# Patient Record
Sex: FEMALE | Race: ASIAN | NOT HISPANIC OR LATINO | Employment: FULL TIME | URBAN - METROPOLITAN AREA
[De-identification: names, ages, dates, MRNs, and addresses within clinical notes are randomized per-mention and may not be internally consistent; named-entity substitution may affect disease eponyms.]

---

## 2020-01-22 ENCOUNTER — OFFICE VISIT (OUTPATIENT)
Dept: PRIMARY CARE CLINIC | Facility: CLINIC | Age: 30
End: 2020-01-22

## 2020-01-22 VITALS
DIASTOLIC BLOOD PRESSURE: 58 MMHG | TEMPERATURE: 100 F | WEIGHT: 115.44 LBS | SYSTOLIC BLOOD PRESSURE: 98 MMHG | HEIGHT: 62 IN | BODY MASS INDEX: 21.24 KG/M2 | RESPIRATION RATE: 18 BRPM | OXYGEN SATURATION: 98 % | HEART RATE: 98 BPM

## 2020-01-22 DIAGNOSIS — H66.92 LEFT OTITIS MEDIA, UNSPECIFIED OTITIS MEDIA TYPE: ICD-10-CM

## 2020-01-22 DIAGNOSIS — J10.1 INFLUENZA A: ICD-10-CM

## 2020-01-22 DIAGNOSIS — R68.89 FLU-LIKE SYMPTOMS: Primary | ICD-10-CM

## 2020-01-22 DIAGNOSIS — J02.9 PHARYNGITIS, UNSPECIFIED ETIOLOGY: ICD-10-CM

## 2020-01-22 LAB
CTP QC/QA: YES
POC MOLECULAR INFLUENZA A AGN: POSITIVE
POC MOLECULAR INFLUENZA B AGN: NEGATIVE

## 2020-01-22 PROCEDURE — 99999 PR PBB SHADOW E&M-NEW PATIENT-LVL III: CPT | Mod: PBBFAC,,, | Performed by: INTERNAL MEDICINE

## 2020-01-22 PROCEDURE — 99213 OFFICE O/P EST LOW 20 MIN: CPT | Mod: S$PBB,,, | Performed by: INTERNAL MEDICINE

## 2020-01-22 PROCEDURE — 99203 OFFICE O/P NEW LOW 30 MIN: CPT | Mod: PBBFAC,PN,25 | Performed by: INTERNAL MEDICINE

## 2020-01-22 PROCEDURE — 99999 PR PBB SHADOW E&M-NEW PATIENT-LVL III: ICD-10-PCS | Mod: PBBFAC,,, | Performed by: INTERNAL MEDICINE

## 2020-01-22 PROCEDURE — 99213 PR OFFICE/OUTPT VISIT, EST, LEVL III, 20-29 MIN: ICD-10-PCS | Mod: S$PBB,,, | Performed by: INTERNAL MEDICINE

## 2020-01-22 PROCEDURE — 87502 INFLUENZA DNA AMP PROBE: CPT | Mod: PBBFAC,PN | Performed by: INTERNAL MEDICINE

## 2020-01-22 PROCEDURE — 96372 THER/PROPH/DIAG INJ SC/IM: CPT | Mod: PBBFAC,PN

## 2020-01-22 RX ORDER — AMOXICILLIN AND CLAVULANATE POTASSIUM 875; 125 MG/1; MG/1
1 TABLET, FILM COATED ORAL EVERY 12 HOURS
Qty: 20 TABLET | Refills: 0 | Status: SHIPPED | OUTPATIENT
Start: 2020-01-22 | End: 2021-10-21

## 2020-01-22 RX ORDER — OSELTAMIVIR PHOSPHATE 75 MG/1
75 CAPSULE ORAL 2 TIMES DAILY
Qty: 10 CAPSULE | Refills: 0 | Status: SHIPPED | OUTPATIENT
Start: 2020-01-22 | End: 2020-01-27

## 2020-01-22 RX ORDER — CODEINE PHOSPHATE AND GUAIFENESIN 10; 100 MG/5ML; MG/5ML
5 SOLUTION ORAL EVERY 6 HOURS PRN
Qty: 120 ML | Refills: 0 | Status: SHIPPED | OUTPATIENT
Start: 2020-01-22 | End: 2021-10-21

## 2020-01-22 RX ORDER — TRIAMCINOLONE ACETONIDE 40 MG/ML
60 INJECTION, SUSPENSION INTRA-ARTICULAR; INTRAMUSCULAR ONCE
Status: COMPLETED | OUTPATIENT
Start: 2020-01-22 | End: 2020-01-22

## 2020-01-22 RX ORDER — NAPROXEN SODIUM 220 MG
220 TABLET ORAL
COMMUNITY
End: 2021-10-21

## 2020-01-22 RX ADMIN — CEFTRIAXONE SODIUM 1 G: 1 INJECTION, POWDER, FOR SOLUTION INTRAMUSCULAR; INTRAVENOUS at 04:01

## 2020-01-22 RX ADMIN — TRIAMCINOLONE ACETONIDE 60 MG: 40 INJECTION, SUSPENSION INTRA-ARTICULAR; INTRAMUSCULAR at 04:01

## 2020-01-22 NOTE — PROGRESS NOTES
Verified pt ID using name and . NKDA.  Administered Rocephin 1gm IM given right VG and Kenalog 60mg given left VG IM per physician order using aseptic technique. Aspirated and no blood return noted. Pt tolerated well with no adverse reactions noted.

## 2020-01-22 NOTE — PROGRESS NOTES
Subjective:       Patient ID: Sean Trujillo is a 30 y.o. female.    Chief Complaint: Cough (cough and bodyaches x 5 days) and Otalgia (left ear)    HPI  patient complained begin the get sick about 5 days ago with coughing congestion severe body ache fever now coughing getting worse also her left ear begin to hurt seen yesterday nausea no vomiting patient did not have flu vaccine this year currently not pregnant patient request injections deny any past medical history of past surgical history patient not a smoker no EtOH family history unremarkable review of system negative except as above  Review of Systems    Objective:      Physical Exam   Constitutional: She is oriented to person, place, and time. She appears well-developed and well-nourished. No distress.   HENT:   Head: Normocephalic and atraumatic.   Right Ear: External ear normal.   Mouth/Throat: Oropharynx is clear and moist. No oropharyngeal exudate.   Left external ear canal normal tympanic membrane severe patchy erythema  Nasal congestion bilaterally   Eyes: Pupils are equal, round, and reactive to light. Conjunctivae and EOM are normal. Right eye exhibits no discharge. Left eye exhibits no discharge.   Neck: Normal range of motion. Neck supple. No thyromegaly present.   Cardiovascular: Normal rate, regular rhythm, normal heart sounds and intact distal pulses. Exam reveals no gallop and no friction rub.   No murmur heard.  Pulmonary/Chest: Effort normal and breath sounds normal. No respiratory distress. She has no wheezes. She has no rales. She exhibits no tenderness.   Constant cough   Abdominal: Soft. Bowel sounds are normal. She exhibits no distension. There is no tenderness. There is no rebound and no guarding.   Musculoskeletal: Normal range of motion. She exhibits no edema, tenderness or deformity.   Lymphadenopathy:     She has no cervical adenopathy.   Neurological: She is alert and oriented to person, place, and time.   Skin: Skin is warm and dry.  Capillary refill takes less than 2 seconds. No rash noted. No erythema.   Psychiatric: She has a normal mood and affect. Judgment and thought content normal.   Nursing note and vitals reviewed.      Assessment:       1. Flu-like symptoms    2. Influenza A    3. Left otitis media, unspecified otitis media type    4. Pharyngitis, unspecified etiology        Plan:       Flu-like symptoms  -     POCT Influenza A/B Molecular  -     guaifenesin-codeine 100-10 mg/5 ml (TUSSI-ORGANIDIN NR)  mg/5 mL syrup; Take 5 mLs by mouth every 6 (six) hours as needed.  Dispense: 120 mL; Refill: 0    Influenza A  -     oseltamivir (TAMIFLU) 75 MG capsule; Take 1 capsule (75 mg total) by mouth 2 (two) times daily. for 5 days  Dispense: 10 capsule; Refill: 0    Left otitis media, unspecified otitis media type  -     triamcinolone acetonide injection 60 mg  -     cefTRIAXone (ROCEPHIN) 1 g in lidocaine HCL 10 mg/ml (1%) IM only syringe  -     amoxicillin-clavulanate 875-125mg (AUGMENTIN) 875-125 mg per tablet; Take 1 tablet by mouth every 12 (twelve) hours.  Dispense: 20 tablet; Refill: 0    Pharyngitis, unspecified etiology  -     triamcinolone acetonide injection 60 mg  -     cefTRIAXone (ROCEPHIN) 1 g in lidocaine HCL 10 mg/ml (1%) IM only syringe  -     amoxicillin-clavulanate 875-125mg (AUGMENTIN) 875-125 mg per tablet; Take 1 tablet by mouth every 12 (twelve) hours.  Dispense: 20 tablet; Refill: 0

## 2021-10-12 ENCOUNTER — TELEPHONE (OUTPATIENT)
Dept: PRIMARY CARE CLINIC | Facility: CLINIC | Age: 31
End: 2021-10-12

## 2021-10-21 ENCOUNTER — OFFICE VISIT (OUTPATIENT)
Dept: PRIMARY CARE CLINIC | Facility: CLINIC | Age: 31
End: 2021-10-21
Payer: MEDICAID

## 2021-10-21 VITALS
DIASTOLIC BLOOD PRESSURE: 60 MMHG | WEIGHT: 112.44 LBS | HEART RATE: 79 BPM | OXYGEN SATURATION: 99 % | SYSTOLIC BLOOD PRESSURE: 98 MMHG | RESPIRATION RATE: 18 BRPM | BODY MASS INDEX: 20.69 KG/M2 | HEIGHT: 62 IN

## 2021-10-21 DIAGNOSIS — Z01.419 ROUTINE GYNECOLOGICAL EXAMINATION: ICD-10-CM

## 2021-10-21 DIAGNOSIS — Z11.59 NEED FOR HEPATITIS C SCREENING TEST: ICD-10-CM

## 2021-10-21 DIAGNOSIS — Z13.220 ENCOUNTER FOR LIPID SCREENING FOR CARDIOVASCULAR DISEASE: ICD-10-CM

## 2021-10-21 DIAGNOSIS — Z00.00 ANNUAL PHYSICAL EXAM: Primary | ICD-10-CM

## 2021-10-21 DIAGNOSIS — Z13.6 ENCOUNTER FOR LIPID SCREENING FOR CARDIOVASCULAR DISEASE: ICD-10-CM

## 2021-10-21 PROCEDURE — 87491 CHLMYD TRACH DNA AMP PROBE: CPT | Performed by: INTERNAL MEDICINE

## 2021-10-21 PROCEDURE — 99395 PR PREVENTIVE VISIT,EST,18-39: ICD-10-PCS | Mod: S$PBB,,, | Performed by: INTERNAL MEDICINE

## 2021-10-21 PROCEDURE — 87591 N.GONORRHOEAE DNA AMP PROB: CPT | Performed by: INTERNAL MEDICINE

## 2021-10-21 PROCEDURE — 99999 PR PBB SHADOW E&M-EST. PATIENT-LVL IV: ICD-10-PCS | Mod: PBBFAC,,, | Performed by: INTERNAL MEDICINE

## 2021-10-21 PROCEDURE — 88175 CYTOPATH C/V AUTO FLUID REDO: CPT | Performed by: PATHOLOGY

## 2021-10-21 PROCEDURE — 88141 PR  CYTOPATH CERV/VAG INTERPRET: ICD-10-PCS | Mod: ,,, | Performed by: PATHOLOGY

## 2021-10-21 PROCEDURE — 99999 PR PBB SHADOW E&M-EST. PATIENT-LVL IV: CPT | Mod: PBBFAC,,, | Performed by: INTERNAL MEDICINE

## 2021-10-21 PROCEDURE — 99395 PREV VISIT EST AGE 18-39: CPT | Mod: S$PBB,,, | Performed by: INTERNAL MEDICINE

## 2021-10-21 PROCEDURE — 99214 OFFICE O/P EST MOD 30 MIN: CPT | Mod: PBBFAC,PN | Performed by: INTERNAL MEDICINE

## 2021-10-21 PROCEDURE — 88141 CYTOPATH C/V INTERPRET: CPT | Mod: ,,, | Performed by: PATHOLOGY

## 2021-10-21 RX ORDER — PREDNISOLONE ACETATE 10 MG/ML
SUSPENSION/ DROPS OPHTHALMIC
COMMUNITY
Start: 2021-10-07 | End: 2024-01-30

## 2021-10-22 LAB
C TRACH DNA SPEC QL NAA+PROBE: NOT DETECTED
N GONORRHOEA DNA SPEC QL NAA+PROBE: NOT DETECTED

## 2021-11-02 LAB
FINAL PATHOLOGIC DIAGNOSIS: ABNORMAL
Lab: ABNORMAL

## 2021-11-03 ENCOUNTER — TELEPHONE (OUTPATIENT)
Dept: PRIMARY CARE CLINIC | Facility: CLINIC | Age: 31
End: 2021-11-03
Payer: MEDICAID

## 2021-11-05 ENCOUNTER — PATIENT MESSAGE (OUTPATIENT)
Dept: PRIMARY CARE CLINIC | Facility: CLINIC | Age: 31
End: 2021-11-05
Payer: MEDICAID

## 2021-12-23 ENCOUNTER — OFFICE VISIT (OUTPATIENT)
Dept: OBSTETRICS AND GYNECOLOGY | Facility: CLINIC | Age: 31
End: 2021-12-23
Payer: MEDICAID

## 2021-12-23 VITALS
BODY MASS INDEX: 21.91 KG/M2 | WEIGHT: 119.06 LBS | HEIGHT: 62 IN | DIASTOLIC BLOOD PRESSURE: 60 MMHG | SYSTOLIC BLOOD PRESSURE: 102 MMHG

## 2021-12-23 DIAGNOSIS — N89.8 VAGINAL DISCHARGE: ICD-10-CM

## 2021-12-23 DIAGNOSIS — Z12.4 CERVICAL CANCER SCREENING: Primary | ICD-10-CM

## 2021-12-23 PROCEDURE — 88175 CYTOPATH C/V AUTO FLUID REDO: CPT | Performed by: STUDENT IN AN ORGANIZED HEALTH CARE EDUCATION/TRAINING PROGRAM

## 2021-12-23 PROCEDURE — 99213 OFFICE O/P EST LOW 20 MIN: CPT | Mod: PBBFAC,PN | Performed by: STUDENT IN AN ORGANIZED HEALTH CARE EDUCATION/TRAINING PROGRAM

## 2021-12-23 PROCEDURE — 99385 PR PREVENTIVE VISIT,NEW,18-39: ICD-10-PCS | Mod: S$PBB,,, | Performed by: STUDENT IN AN ORGANIZED HEALTH CARE EDUCATION/TRAINING PROGRAM

## 2021-12-23 PROCEDURE — 87481 CANDIDA DNA AMP PROBE: CPT | Mod: 59 | Performed by: STUDENT IN AN ORGANIZED HEALTH CARE EDUCATION/TRAINING PROGRAM

## 2021-12-23 PROCEDURE — 1160F RVW MEDS BY RX/DR IN RCRD: CPT | Mod: CPTII,,, | Performed by: STUDENT IN AN ORGANIZED HEALTH CARE EDUCATION/TRAINING PROGRAM

## 2021-12-23 PROCEDURE — 3078F PR MOST RECENT DIASTOLIC BLOOD PRESSURE < 80 MM HG: ICD-10-PCS | Mod: CPTII,,, | Performed by: STUDENT IN AN ORGANIZED HEALTH CARE EDUCATION/TRAINING PROGRAM

## 2021-12-23 PROCEDURE — 99385 PREV VISIT NEW AGE 18-39: CPT | Mod: S$PBB,,, | Performed by: STUDENT IN AN ORGANIZED HEALTH CARE EDUCATION/TRAINING PROGRAM

## 2021-12-23 PROCEDURE — 3078F DIAST BP <80 MM HG: CPT | Mod: CPTII,,, | Performed by: STUDENT IN AN ORGANIZED HEALTH CARE EDUCATION/TRAINING PROGRAM

## 2021-12-23 PROCEDURE — 99999 PR PBB SHADOW E&M-EST. PATIENT-LVL III: CPT | Mod: PBBFAC,,, | Performed by: STUDENT IN AN ORGANIZED HEALTH CARE EDUCATION/TRAINING PROGRAM

## 2021-12-23 PROCEDURE — 3074F SYST BP LT 130 MM HG: CPT | Mod: CPTII,,, | Performed by: STUDENT IN AN ORGANIZED HEALTH CARE EDUCATION/TRAINING PROGRAM

## 2021-12-23 PROCEDURE — 1159F PR MEDICATION LIST DOCUMENTED IN MEDICAL RECORD: ICD-10-PCS | Mod: CPTII,,, | Performed by: STUDENT IN AN ORGANIZED HEALTH CARE EDUCATION/TRAINING PROGRAM

## 2021-12-23 PROCEDURE — 99999 PR PBB SHADOW E&M-EST. PATIENT-LVL III: ICD-10-PCS | Mod: PBBFAC,,, | Performed by: STUDENT IN AN ORGANIZED HEALTH CARE EDUCATION/TRAINING PROGRAM

## 2021-12-23 PROCEDURE — 3008F PR BODY MASS INDEX (BMI) DOCUMENTED: ICD-10-PCS | Mod: CPTII,,, | Performed by: STUDENT IN AN ORGANIZED HEALTH CARE EDUCATION/TRAINING PROGRAM

## 2021-12-23 PROCEDURE — 1159F MED LIST DOCD IN RCRD: CPT | Mod: CPTII,,, | Performed by: STUDENT IN AN ORGANIZED HEALTH CARE EDUCATION/TRAINING PROGRAM

## 2021-12-23 PROCEDURE — 87491 CHLMYD TRACH DNA AMP PROBE: CPT | Performed by: STUDENT IN AN ORGANIZED HEALTH CARE EDUCATION/TRAINING PROGRAM

## 2021-12-23 PROCEDURE — 1160F PR REVIEW ALL MEDS BY PRESCRIBER/CLIN PHARMACIST DOCUMENTED: ICD-10-PCS | Mod: CPTII,,, | Performed by: STUDENT IN AN ORGANIZED HEALTH CARE EDUCATION/TRAINING PROGRAM

## 2021-12-23 PROCEDURE — 3074F PR MOST RECENT SYSTOLIC BLOOD PRESSURE < 130 MM HG: ICD-10-PCS | Mod: CPTII,,, | Performed by: STUDENT IN AN ORGANIZED HEALTH CARE EDUCATION/TRAINING PROGRAM

## 2021-12-23 PROCEDURE — 87624 HPV HI-RISK TYP POOLED RSLT: CPT | Performed by: STUDENT IN AN ORGANIZED HEALTH CARE EDUCATION/TRAINING PROGRAM

## 2021-12-23 PROCEDURE — 3008F BODY MASS INDEX DOCD: CPT | Mod: CPTII,,, | Performed by: STUDENT IN AN ORGANIZED HEALTH CARE EDUCATION/TRAINING PROGRAM

## 2021-12-23 PROCEDURE — 87591 N.GONORRHOEAE DNA AMP PROB: CPT | Performed by: STUDENT IN AN ORGANIZED HEALTH CARE EDUCATION/TRAINING PROGRAM

## 2021-12-30 LAB
C TRACH DNA SPEC QL NAA+PROBE: NOT DETECTED
N GONORRHOEA DNA SPEC QL NAA+PROBE: NOT DETECTED

## 2022-01-03 LAB
BACTERIAL VAGINOSIS DNA: NEGATIVE
CANDIDA GLABRATA DNA: NEGATIVE
CANDIDA KRUSEI DNA: NEGATIVE
CANDIDA RRNA VAG QL PROBE: NEGATIVE
FINAL PATHOLOGIC DIAGNOSIS: NORMAL
Lab: NORMAL
T VAGINALIS RRNA GENITAL QL PROBE: NEGATIVE

## 2022-01-04 LAB
HPV HR 12 DNA SPEC QL NAA+PROBE: NEGATIVE
HPV16 AG SPEC QL: NEGATIVE
HPV18 DNA SPEC QL NAA+PROBE: NEGATIVE

## 2022-01-05 ENCOUNTER — PATIENT MESSAGE (OUTPATIENT)
Dept: OBSTETRICS AND GYNECOLOGY | Facility: CLINIC | Age: 32
End: 2022-01-05
Payer: MEDICAID

## 2022-01-05 RX ORDER — METRONIDAZOLE 500 MG/1
500 TABLET ORAL EVERY 12 HOURS
Qty: 14 TABLET | Refills: 0 | Status: SHIPPED | OUTPATIENT
Start: 2022-01-05 | End: 2022-01-12

## 2022-01-14 ENCOUNTER — NURSE TRIAGE (OUTPATIENT)
Dept: ADMINISTRATIVE | Facility: CLINIC | Age: 32
End: 2022-01-14
Payer: MEDICAID

## 2022-01-14 DIAGNOSIS — R05.9 COUGH: ICD-10-CM

## 2022-01-14 DIAGNOSIS — J22 LOWER RESPIRATORY INFECTION: Primary | ICD-10-CM

## 2022-01-14 DIAGNOSIS — J40 BRONCHITIS: ICD-10-CM

## 2022-01-14 RX ORDER — DOXYCYCLINE 100 MG/1
100 CAPSULE ORAL 2 TIMES DAILY
Qty: 14 CAPSULE | Refills: 0 | Status: SHIPPED | OUTPATIENT
Start: 2022-01-14 | End: 2023-07-28

## 2022-01-14 RX ORDER — FAMOTIDINE 40 MG/1
40 TABLET, FILM COATED ORAL 2 TIMES DAILY
Qty: 20 TABLET | Refills: 0 | Status: SHIPPED | OUTPATIENT
Start: 2022-01-14 | End: 2023-07-28

## 2022-01-14 RX ORDER — PROMETHAZINE HYDROCHLORIDE AND CODEINE PHOSPHATE 6.25; 1 MG/5ML; MG/5ML
5 SOLUTION ORAL EVERY 4 HOURS PRN
Qty: 30 EACH | Refills: 0 | Status: SHIPPED | OUTPATIENT
Start: 2022-01-14 | End: 2022-01-24

## 2022-01-14 RX ORDER — ERGOCALCIFEROL 1.25 MG/1
50000 CAPSULE ORAL
Qty: 12 CAPSULE | Refills: 0 | Status: SHIPPED | OUTPATIENT
Start: 2022-01-14 | End: 2022-04-02

## 2022-01-14 RX ORDER — QUERCETIN DIHYDRATE 100 %
300 POWDER (GRAM) MISCELLANEOUS
Qty: 40 G | Refills: 1 | Status: SHIPPED | OUTPATIENT
Start: 2022-01-14 | End: 2022-01-24

## 2022-01-14 RX ORDER — ALBUTEROL SULFATE 90 UG/1
2 AEROSOL, METERED RESPIRATORY (INHALATION) EVERY 6 HOURS PRN
Qty: 18 G | Refills: 0 | Status: SHIPPED | OUTPATIENT
Start: 2022-01-14 | End: 2023-07-28

## 2022-01-14 RX ORDER — BENZONATATE 200 MG/1
200 CAPSULE ORAL 3 TIMES DAILY PRN
Qty: 60 CAPSULE | Refills: 1 | Status: SHIPPED | OUTPATIENT
Start: 2022-01-14 | End: 2022-02-13

## 2022-01-15 NOTE — TELEPHONE ENCOUNTER
William added to the call ID# [id number].  Patient states   She is having flu like symptoms.  Current symptoms are cough (over a month) weeks, fatigue, chills, headache, sore throat, and body aches.   Patient's cough is continuous.  States the other symptoms started after cough.   She has taken Delsym, Theraflu, and ginger tea.  Advised patient to be seen within 3-4 hours she declined.  She states she wants something called in. Spoke with on call provider Dr. Fulton whom states that patient should try robitussin DM and go to Urgent Care tomorrow.  States he will send medication after reviewing chart of he feels it is necessary.  He wants  patient to go to ER [facility] if she experiences the following symptoms low B/P, tachycardia, fever, oxygen saturation of 92% or lower, or coughing fits thtoat are continuous.   Patient given information and she verbalized understanding.   Reason for Disposition   [1] Continuous (nonstop) coughing interferes with work or school AND [2] no improvement using cough treatment per protocol    Additional Information   Negative: SEVERE difficulty breathing (e.g., struggling for each breath, speaks in single words)   Negative: Difficult to awaken or acting confused (e.g., disoriented, slurred speech)   Negative: Bluish (or gray) lips or face now   Negative: Shock suspected (e.g., cold/pale/clammy skin, too weak to stand, low BP, rapid pulse)   Negative: Sounds like a life-threatening emergency to the triager   Negative: SEVERE or constant chest pain or pressure (Exception: mild central chest pain, present only when coughing)   Negative: MODERATE difficulty breathing (e.g., speaks in phrases, SOB even at rest, pulse 100-120)   Negative: [1] Headache AND [2] stiff neck (can't touch chin to chest)   Negative: MILD difficulty breathing (e.g., minimal/no SOB at rest, SOB with walking, pulse <100)   Negative: Chest pain or pressure   Negative: Patient sounds very sick or weak to  the triager   Negative: Fever > 103 F (39.4 C)   Negative: [1] Fever > 101 F (38.3 C) AND [2] age > 60 years   Negative: [1] Fever > 100.0 F (37.8 C) AND [2] bedridden (e.g., nursing home patient, CVA, chronic illness, recovering from surgery)   Negative: HIGH RISK for severe COVID complications (e.g., age > 64 years, obesity with BMI > 25, pregnant, chronic lung disease or other chronic medical condition)  (Exception: Already seen by PCP and no new or worsening symptoms.)   Negative: [1] HIGH RISK patient AND [2] influenza is widespread in the community AND [3] ONE OR MORE respiratory symptoms: cough, sore throat, runny or stuffy nose   Negative: [1] HIGH RISK patient AND [2] influenza exposure within the last 7 days AND [3] ONE OR MORE respiratory symptoms: cough, sore throat, runny or stuffy nose   Negative: [1] COVID-19 infection suspected by caller or triager AND [2] mild symptoms (cough, fever, or others) AND [3] negative COVID-19 rapid test   Negative: Fever present > 3 days (72 hours)   Negative: [1] Fever returns after gone for over 24 hours AND [2] symptoms worse or not improved    Protocols used: CORONAVIRUS (COVID-19) DIAGNOSED OR [id number]

## 2022-01-18 ENCOUNTER — PATIENT MESSAGE (OUTPATIENT)
Dept: PRIMARY CARE CLINIC | Facility: CLINIC | Age: 32
End: 2022-01-18
Payer: MEDICAID

## 2022-02-03 ENCOUNTER — OFFICE VISIT (OUTPATIENT)
Dept: PRIMARY CARE CLINIC | Facility: CLINIC | Age: 32
End: 2022-02-03
Payer: MEDICAID

## 2022-02-03 DIAGNOSIS — J30.2 SEASONAL ALLERGIC RHINITIS, UNSPECIFIED TRIGGER: Primary | ICD-10-CM

## 2022-02-03 DIAGNOSIS — R05.9 COUGHING: ICD-10-CM

## 2022-02-03 PROCEDURE — 99213 OFFICE O/P EST LOW 20 MIN: CPT | Mod: 95,,, | Performed by: INTERNAL MEDICINE

## 2022-02-03 PROCEDURE — 99213 PR OFFICE/OUTPT VISIT, EST, LEVL III, 20-29 MIN: ICD-10-PCS | Mod: 95,,, | Performed by: INTERNAL MEDICINE

## 2022-02-03 RX ORDER — LEVOCETIRIZINE DIHYDROCHLORIDE 5 MG/1
5 TABLET, FILM COATED ORAL NIGHTLY
Qty: 30 TABLET | Refills: 5 | Status: SHIPPED | OUTPATIENT
Start: 2022-02-03 | End: 2023-07-20 | Stop reason: SDUPTHER

## 2022-02-03 NOTE — PROGRESS NOTES
Subjective:    The patient location is: car  The chief complaint leading to consultation is: coughing congestion x 2 months    Visit type: audiovisual    Face to Face time with patient: 12   minutes of total time spent on the encounter, which includes face to face time and non-face to face time preparing to see the patient (eg, review of tests), Obtaining and/or reviewing separately obtained history, Documenting clinical information in the electronic or other health record, Independently interpreting results (not separately reported) and communicating results to the patient/family/caregiver, or Care coordination (not separately reported).         Each patient to whom he or she provides medical services by telemedicine is:  (1) informed of the relationship between the physician and patient and the respective role of any other health care provider with respect to management of the patient; and (2) notified that he or she may decline to receive medical services by telemedicine and may withdraw from such care at any time.    Notes:    Patient ID: Sean Trujillo is a 32 y.o. female.    Chief Complaint: No chief complaint on file.    HPI  Pt visit today c/o chronic coughing congestion  More than 2 months she was given meds but did not take them except take MDI sometime when has sob she had chill but no fever she is doing james now except the coughing scratchy throat no fever chill night sweat cough nonproductive and not smoking not pegnant and this happened every year with weather change   Review of Systems    Objective:      Physical Exam  Constitutional:       General: She is not in acute distress.     Appearance: Normal appearance.   HENT:      Head: Atraumatic.   Eyes:      Extraocular Movements: Extraocular movements intact.   Pulmonary:      Effort: Pulmonary effort is normal.   Neurological:      General: No focal deficit present.      Mental Status: She is alert and oriented to person, place, and time.   Psychiatric:          Mood and Affect: Mood normal.         Thought Content: Thought content normal.         Judgment: Judgment normal.         Assessment:       1. Seasonal allergic rhinitis, unspecified trigger    2. Coughing        Plan:       Seasonal allergic rhinitis, unspecified trigger  -     levocetirizine (XYZAL) 5 MG tablet; Take 1 tablet (5 mg total) by mouth every evening.  Dispense: 30 tablet; Refill: 5    Coughing  Comments:  continue with tessalon reji prn        Medication List with Changes/Refills   New Medications    LEVOCETIRIZINE (XYZAL) 5 MG TABLET    Take 1 tablet (5 mg total) by mouth every evening.   Current Medications    ALBUTEROL (PROVENTIL HFA) 90 MCG/ACTUATION INHALER    Inhale 2 puffs into the lungs every 6 (six) hours as needed for Wheezing. Rescue    BENZONATATE (TESSALON) 200 MG CAPSULE    Take 1 capsule (200 mg total) by mouth 3 (three) times daily as needed for Cough.    DOXYCYCLINE (VIBRAMYCIN) 100 MG CAP    Take 1 capsule (100 mg total) by mouth 2 (two) times daily.    ERGOCALCIFEROL (ERGOCALCIFEROL) 50,000 UNIT CAP    Take 1 capsule (50,000 Units total) by mouth every 7 days. for 12 doses    FAMOTIDINE (PEPCID) 40 MG TABLET    Take 1 tablet (40 mg total) by mouth 2 (two) times daily. for 10 days    PREDNISOLONE ACETATE (PRED FORTE) 1 % DRPS

## 2022-07-09 ENCOUNTER — OFFICE VISIT (OUTPATIENT)
Dept: URGENT CARE | Facility: CLINIC | Age: 32
End: 2022-07-09
Payer: MEDICAID

## 2022-07-09 VITALS
RESPIRATION RATE: 17 BRPM | SYSTOLIC BLOOD PRESSURE: 94 MMHG | DIASTOLIC BLOOD PRESSURE: 65 MMHG | OXYGEN SATURATION: 99 % | TEMPERATURE: 100 F | HEART RATE: 91 BPM | BODY MASS INDEX: 21.9 KG/M2 | HEIGHT: 62 IN | WEIGHT: 119 LBS

## 2022-07-09 DIAGNOSIS — J02.0 STREP THROAT: Primary | ICD-10-CM

## 2022-07-09 LAB
CTP QC/QA: YES
CTP QC/QA: YES
MOLECULAR STREP A: POSITIVE
SARS-COV-2 RDRP RESP QL NAA+PROBE: NEGATIVE

## 2022-07-09 PROCEDURE — 3078F PR MOST RECENT DIASTOLIC BLOOD PRESSURE < 80 MM HG: ICD-10-PCS | Mod: CPTII,S$GLB,, | Performed by: NURSE PRACTITIONER

## 2022-07-09 PROCEDURE — 87651 POCT STREP A MOLECULAR: ICD-10-PCS | Mod: QW,S$GLB,, | Performed by: NURSE PRACTITIONER

## 2022-07-09 PROCEDURE — 1160F PR REVIEW ALL MEDS BY PRESCRIBER/CLIN PHARMACIST DOCUMENTED: ICD-10-PCS | Mod: CPTII,S$GLB,, | Performed by: NURSE PRACTITIONER

## 2022-07-09 PROCEDURE — 3074F SYST BP LT 130 MM HG: CPT | Mod: CPTII,S$GLB,, | Performed by: NURSE PRACTITIONER

## 2022-07-09 PROCEDURE — U0002 COVID-19 LAB TEST NON-CDC: HCPCS | Mod: QW,S$GLB,, | Performed by: NURSE PRACTITIONER

## 2022-07-09 PROCEDURE — 3074F PR MOST RECENT SYSTOLIC BLOOD PRESSURE < 130 MM HG: ICD-10-PCS | Mod: CPTII,S$GLB,, | Performed by: NURSE PRACTITIONER

## 2022-07-09 PROCEDURE — 1159F PR MEDICATION LIST DOCUMENTED IN MEDICAL RECORD: ICD-10-PCS | Mod: CPTII,S$GLB,, | Performed by: NURSE PRACTITIONER

## 2022-07-09 PROCEDURE — U0002: ICD-10-PCS | Mod: QW,S$GLB,, | Performed by: NURSE PRACTITIONER

## 2022-07-09 PROCEDURE — 3078F DIAST BP <80 MM HG: CPT | Mod: CPTII,S$GLB,, | Performed by: NURSE PRACTITIONER

## 2022-07-09 PROCEDURE — 3008F BODY MASS INDEX DOCD: CPT | Mod: CPTII,S$GLB,, | Performed by: NURSE PRACTITIONER

## 2022-07-09 PROCEDURE — 1160F RVW MEDS BY RX/DR IN RCRD: CPT | Mod: CPTII,S$GLB,, | Performed by: NURSE PRACTITIONER

## 2022-07-09 PROCEDURE — 3008F PR BODY MASS INDEX (BMI) DOCUMENTED: ICD-10-PCS | Mod: CPTII,S$GLB,, | Performed by: NURSE PRACTITIONER

## 2022-07-09 PROCEDURE — 1159F MED LIST DOCD IN RCRD: CPT | Mod: CPTII,S$GLB,, | Performed by: NURSE PRACTITIONER

## 2022-07-09 PROCEDURE — 99213 PR OFFICE/OUTPT VISIT, EST, LEVL III, 20-29 MIN: ICD-10-PCS | Mod: S$GLB,,, | Performed by: NURSE PRACTITIONER

## 2022-07-09 PROCEDURE — 87651 STREP A DNA AMP PROBE: CPT | Mod: QW,S$GLB,, | Performed by: NURSE PRACTITIONER

## 2022-07-09 PROCEDURE — 99213 OFFICE O/P EST LOW 20 MIN: CPT | Mod: S$GLB,,, | Performed by: NURSE PRACTITIONER

## 2022-07-09 RX ORDER — IBUPROFEN 800 MG/1
800 TABLET ORAL EVERY 6 HOURS PRN
COMMUNITY
Start: 2022-02-18 | End: 2024-01-30

## 2022-07-09 RX ORDER — PREDNISONE 20 MG/1
20 TABLET ORAL DAILY
Qty: 3 TABLET | Refills: 0 | Status: SHIPPED | OUTPATIENT
Start: 2022-07-09 | End: 2022-07-12

## 2022-07-09 RX ORDER — AMOXICILLIN 500 MG/1
500 CAPSULE ORAL EVERY 12 HOURS
Qty: 20 CAPSULE | Refills: 0 | Status: SHIPPED | OUTPATIENT
Start: 2022-07-09 | End: 2022-07-19

## 2022-07-09 NOTE — PROGRESS NOTES
"Subjective:       Patient ID: Sean Trujillo is a 32 y.o. female.    Vitals:  height is 5' 2" (1.575 m) and weight is 54 kg (119 lb). Her oral temperature is 99.5 °F (37.5 °C). Her blood pressure is 94/65 and her pulse is 91. Her respiration is 17 and oxygen saturation is 99%.     Chief Complaint: URI    32-year-old female presents to clinic for evaluation of bilateral ear pain, headaches, sore throat, nausea x3 days.  Patient is vaccinated for COVID, denies any recent sick contacts.  She has been taking ibuprofen with some relief.  She states that her main complaint today is her sore throat.  She denies chest pain or shortness of breath.  She is awake and alert, answers questions appropriately, no acute distress noted on today's visit.    URI   This is a new problem. Episode onset: 2 days ago. The problem has been gradually worsening. There has been no fever. Associated symptoms include ear pain, headaches, nausea and a sore throat. Pertinent negatives include no abdominal pain, chest pain or coughing. Associated symptoms comments: Trouble swallowing, body aches, congestion. Treatments tried: ibuprofen. The treatment provided mild relief.       Constitution: Negative for activity change, appetite change, chills, sweating, fatigue and fever.   HENT: Positive for ear pain and sore throat.    Cardiovascular: Negative for chest pain, palpitations and sob on exertion.   Respiratory: Negative for cough and shortness of breath.    Gastrointestinal: Positive for nausea. Negative for abdominal pain.   Neurological: Positive for headaches. Negative for numbness and tingling.       Objective:      Physical Exam   Constitutional: She is oriented to person, place, and time. She appears well-developed.  Non-toxic appearance. She does not appear ill. No distress.   HENT:   Head: Normocephalic and atraumatic. Head is without abrasion, without contusion and without laceration.   Ears:   Right Ear: External ear normal.   Left Ear: " External ear normal.   Nose: Nose normal.   Mouth/Throat: Uvula is midline and mucous membranes are normal. Mucous membranes are moist. No uvula swelling. Oropharyngeal exudate, posterior oropharyngeal edema and posterior oropharyngeal erythema present.   Eyes: Conjunctivae, EOM and lids are normal. Right eye exhibits no discharge. Left eye exhibits no discharge.   Neck: Trachea normal and phonation normal.   Cardiovascular: Normal rate, regular rhythm and normal heart sounds.   Pulmonary/Chest: Effort normal and breath sounds normal. No stridor. No respiratory distress. She has no wheezes.   Abdominal: Normal appearance.   Musculoskeletal: Normal range of motion.         General: Normal range of motion.   Neurological: She is alert and oriented to person, place, and time.   Skin: Skin is warm, dry, intact, not diaphoretic and not pale. No abrasion, No burn and No ecchymosis   Psychiatric: Her speech is normal and behavior is normal. Mood, judgment and thought content normal.   Nursing note and vitals reviewed.    Results for orders placed or performed in visit on 07/09/22   POCT COVID-19 Rapid Screening   Result Value Ref Range    POC Rapid COVID Negative Negative     Acceptable Yes    POCT Strep A, Molecular   Result Value Ref Range    Molecular Strep A, POC Positive (A) Negative     Acceptable Yes            Assessment:       1. Strep throat          Plan:         Strep throat  -     POCT COVID-19 Rapid Screening  -     POCT Strep A, Molecular  -     amoxicillin (AMOXIL) 500 MG capsule; Take 1 capsule (500 mg total) by mouth every 12 (twelve) hours. for 10 days  Dispense: 20 capsule; Refill: 0  -     (Magic mouthwash) 1:1:1 diphenhydramine(Benadryl) 12.5mg/5ml liq, aluminum & magnesium hydroxide-simethicone (Maalox), LIDOcaine viscous 2%; Swish and spit 10 mLs every 4 (four) hours as needed (sore throat). for sore throat  Dispense: 360 mL; Refill: 0  -     predniSONE (DELTASONE) 20  MG tablet; Take 1 tablet (20 mg total) by mouth once daily. for 3 days  Dispense: 3 tablet; Refill: 0    - positive strep on today's visit.  Will treat with antibiotics.  Complete entire course as directed.  Follow-up with PCP, return to clinic as needed.  Patient verbalized understanding and is in agreement with plan.    Patient Instructions   - You must understand that you have received an Urgent Care treatment only and that you may be released before all of your medical problems are known or treated.   - You, the patient, will arrange for follow up care as instructed.   - If your condition worsens or fails to improve we recommend that you receive another evaluation at the ER immediately or contact your PCP to discuss your concerns or return here.

## 2023-07-20 ENCOUNTER — OFFICE VISIT (OUTPATIENT)
Dept: PRIMARY CARE CLINIC | Facility: CLINIC | Age: 33
End: 2023-07-20
Payer: MEDICAID

## 2023-07-20 VITALS
TEMPERATURE: 97 F | WEIGHT: 116.75 LBS | DIASTOLIC BLOOD PRESSURE: 82 MMHG | SYSTOLIC BLOOD PRESSURE: 120 MMHG | HEIGHT: 62 IN | HEART RATE: 65 BPM | BODY MASS INDEX: 21.49 KG/M2 | OXYGEN SATURATION: 98 % | RESPIRATION RATE: 18 BRPM

## 2023-07-20 DIAGNOSIS — J30.2 SEASONAL ALLERGIC RHINITIS, UNSPECIFIED TRIGGER: ICD-10-CM

## 2023-07-20 DIAGNOSIS — Z13.220 ENCOUNTER FOR LIPID SCREENING FOR CARDIOVASCULAR DISEASE: ICD-10-CM

## 2023-07-20 DIAGNOSIS — L50.8 CHRONIC URTICARIA: Primary | ICD-10-CM

## 2023-07-20 DIAGNOSIS — Z13.6 ENCOUNTER FOR LIPID SCREENING FOR CARDIOVASCULAR DISEASE: ICD-10-CM

## 2023-07-20 PROCEDURE — 99213 OFFICE O/P EST LOW 20 MIN: CPT | Mod: PBBFAC,PN | Performed by: INTERNAL MEDICINE

## 2023-07-20 PROCEDURE — 3008F PR BODY MASS INDEX (BMI) DOCUMENTED: ICD-10-PCS | Mod: CPTII,,, | Performed by: INTERNAL MEDICINE

## 2023-07-20 PROCEDURE — 3079F DIAST BP 80-89 MM HG: CPT | Mod: CPTII,,, | Performed by: INTERNAL MEDICINE

## 2023-07-20 PROCEDURE — 3008F BODY MASS INDEX DOCD: CPT | Mod: CPTII,,, | Performed by: INTERNAL MEDICINE

## 2023-07-20 PROCEDURE — 99999 PR PBB SHADOW E&M-EST. PATIENT-LVL III: CPT | Mod: PBBFAC,,, | Performed by: INTERNAL MEDICINE

## 2023-07-20 PROCEDURE — 99214 OFFICE O/P EST MOD 30 MIN: CPT | Mod: S$PBB,,, | Performed by: INTERNAL MEDICINE

## 2023-07-20 PROCEDURE — 99999 PR PBB SHADOW E&M-EST. PATIENT-LVL III: ICD-10-PCS | Mod: PBBFAC,,, | Performed by: INTERNAL MEDICINE

## 2023-07-20 PROCEDURE — 3074F SYST BP LT 130 MM HG: CPT | Mod: CPTII,,, | Performed by: INTERNAL MEDICINE

## 2023-07-20 PROCEDURE — 3079F PR MOST RECENT DIASTOLIC BLOOD PRESSURE 80-89 MM HG: ICD-10-PCS | Mod: CPTII,,, | Performed by: INTERNAL MEDICINE

## 2023-07-20 PROCEDURE — 1160F PR REVIEW ALL MEDS BY PRESCRIBER/CLIN PHARMACIST DOCUMENTED: ICD-10-PCS | Mod: CPTII,,, | Performed by: INTERNAL MEDICINE

## 2023-07-20 PROCEDURE — 1160F RVW MEDS BY RX/DR IN RCRD: CPT | Mod: CPTII,,, | Performed by: INTERNAL MEDICINE

## 2023-07-20 PROCEDURE — 99214 PR OFFICE/OUTPT VISIT, EST, LEVL IV, 30-39 MIN: ICD-10-PCS | Mod: S$PBB,,, | Performed by: INTERNAL MEDICINE

## 2023-07-20 PROCEDURE — 1159F PR MEDICATION LIST DOCUMENTED IN MEDICAL RECORD: ICD-10-PCS | Mod: CPTII,,, | Performed by: INTERNAL MEDICINE

## 2023-07-20 PROCEDURE — 3074F PR MOST RECENT SYSTOLIC BLOOD PRESSURE < 130 MM HG: ICD-10-PCS | Mod: CPTII,,, | Performed by: INTERNAL MEDICINE

## 2023-07-20 PROCEDURE — 1159F MED LIST DOCD IN RCRD: CPT | Mod: CPTII,,, | Performed by: INTERNAL MEDICINE

## 2023-07-20 RX ORDER — LEVOCETIRIZINE DIHYDROCHLORIDE 5 MG/1
5 TABLET, FILM COATED ORAL NIGHTLY
Qty: 30 TABLET | Refills: 5 | Status: SHIPPED | OUTPATIENT
Start: 2023-07-20 | End: 2024-01-30

## 2023-07-20 RX ORDER — DEXAMETHASONE 4 MG/1
4 TABLET ORAL EVERY 12 HOURS PRN
Qty: 20 TABLET | Refills: 1 | Status: SHIPPED | OUTPATIENT
Start: 2023-07-20 | End: 2024-01-30

## 2023-07-20 NOTE — PROGRESS NOTES
Subjective:       Patient ID: Sean Trujillo is a 33 y.o. female.    Chief Complaint: Annual Exam    HPI  Pt visit today with c/o chronic recurrent pruritic skin rash mostly in lower ext and winter time cold weather she also having allergy symptoms with sneezing coughing no fever chill no sob cp she has h/o asthma  in past not pregnant no skin rash at present time  Review of Systems   Constitutional:  Negative for unexpected weight change.   Respiratory:  Negative for shortness of breath.    Cardiovascular:  Negative for chest pain.   Gastrointestinal:  Negative for abdominal pain.   Musculoskeletal:  Negative for arthralgias.   Skin:  Positive for rash.   Psychiatric/Behavioral:  Negative for dysphoric mood.      Objective:      Physical Exam  Vitals and nursing note reviewed.   Constitutional:       General: She is not in acute distress.     Appearance: She is well-developed.   HENT:      Head: Normocephalic and atraumatic.      Right Ear: External ear normal.      Left Ear: External ear normal.      Nose: Nose normal.      Mouth/Throat:      Pharynx: No oropharyngeal exudate.   Eyes:      Extraocular Movements: Extraocular movements intact.      Conjunctiva/sclera: Conjunctivae normal.      Pupils: Pupils are equal, round, and reactive to light.   Neck:      Thyroid: No thyromegaly.   Cardiovascular:      Rate and Rhythm: Normal rate and regular rhythm.      Heart sounds: Normal heart sounds. No murmur heard.    No friction rub. No gallop.   Pulmonary:      Effort: Pulmonary effort is normal. No respiratory distress.      Breath sounds: Normal breath sounds. No wheezing.   Abdominal:      General: Bowel sounds are normal. There is no distension.      Palpations: Abdomen is soft.      Tenderness: There is no abdominal tenderness.   Musculoskeletal:         General: No tenderness or deformity. Normal range of motion.      Cervical back: Normal range of motion and neck supple.   Lymphadenopathy:      Cervical: No  cervical adenopathy.   Skin:     General: Skin is warm and dry.      Capillary Refill: Capillary refill takes less than 2 seconds.      Findings: No erythema or rash.   Neurological:      Mental Status: She is alert and oriented to person, place, and time.   Psychiatric:         Thought Content: Thought content normal.         Judgment: Judgment normal.       Assessment:       1. Chronic urticaria    2. Seasonal allergic rhinitis, unspecified trigger    3. Encounter for lipid screening for cardiovascular disease        Plan:       Chronic urticaria  -     CBC Auto Differential; Future; Expected date: 07/20/2023  -     Comprehensive Metabolic Panel; Future; Expected date: 07/20/2023  -     TSH; Future; Expected date: 07/20/2023  -     Urinalysis; Future; Expected date: 07/20/2023  -     IGE; Future; Expected date: 07/20/2023  -     EVANS Screen w/Reflex; Future; Expected date: 07/20/2023  -     RPR; Future; Expected date: 07/20/2023  -     dexAMETHasone (DECADRON) 4 MG Tab; Take 1 tablet (4 mg total) by mouth every 12 (twelve) hours as needed (urticaria).  Dispense: 20 tablet; Refill: 1    Seasonal allergic rhinitis, unspecified trigger  -     levocetirizine (XYZAL) 5 MG tablet; Take 1 tablet (5 mg total) by mouth every evening.  Dispense: 30 tablet; Refill: 5  -     IGE; Future; Expected date: 07/20/2023  -     dexAMETHasone (DECADRON) 4 MG Tab; Take 1 tablet (4 mg total) by mouth every 12 (twelve) hours as needed (urticaria).  Dispense: 20 tablet; Refill: 1    Encounter for lipid screening for cardiovascular disease  -     Lipid Panel; Future; Expected date: 07/20/2023        Medication List with Changes/Refills   New Medications    DEXAMETHASONE (DECADRON) 4 MG TAB    Take 1 tablet (4 mg total) by mouth every 12 (twelve) hours as needed (urticaria).   Current Medications    IBUPROFEN (ADVIL,MOTRIN) 800 MG TABLET    Take 800 mg by mouth every 6 (six) hours as needed.    PREDNISOLONE ACETATE (PRED FORTE) 1 % DRPS        Changed and/or Refilled Medications    Modified Medication Previous Medication    LEVOCETIRIZINE (XYZAL) 5 MG TABLET levocetirizine (XYZAL) 5 MG tablet       Take 1 tablet (5 mg total) by mouth every evening.    Take 1 tablet (5 mg total) by mouth every evening.   Discontinued Medications    (MAGIC MOUTHWASH) 1:1:1 DIPHENHYDRAMINE(BENADRYL) 12.5MG/5ML LIQ, ALUMINUM & MAGNESIUM HYDROXIDE-SIMETHICONE (MAALOX), LIDOCAINE VISCOUS 2%    Swish and spit 10 mLs every 4 (four) hours as needed (sore throat). for sore throat    ALBUTEROL (PROVENTIL HFA) 90 MCG/ACTUATION INHALER    Inhale 2 puffs into the lungs every 6 (six) hours as needed for Wheezing. Rescue    DOXYCYCLINE (VIBRAMYCIN) 100 MG CAP    Take 1 capsule (100 mg total) by mouth 2 (two) times daily.    FAMOTIDINE (PEPCID) 40 MG TABLET    Take 1 tablet (40 mg total) by mouth 2 (two) times daily. for 10 days

## 2023-09-18 ENCOUNTER — PATIENT MESSAGE (OUTPATIENT)
Dept: PRIMARY CARE CLINIC | Facility: CLINIC | Age: 33
End: 2023-09-18
Payer: MEDICAID

## 2023-10-18 ENCOUNTER — PATIENT MESSAGE (OUTPATIENT)
Dept: CARDIOLOGY | Facility: CLINIC | Age: 33
End: 2023-10-18
Payer: MEDICAID

## 2023-12-04 ENCOUNTER — OFFICE VISIT (OUTPATIENT)
Dept: OBSTETRICS AND GYNECOLOGY | Facility: CLINIC | Age: 33
End: 2023-12-04
Payer: MEDICAID

## 2023-12-04 VITALS
DIASTOLIC BLOOD PRESSURE: 62 MMHG | HEIGHT: 63 IN | WEIGHT: 120.38 LBS | BODY MASS INDEX: 21.33 KG/M2 | SYSTOLIC BLOOD PRESSURE: 104 MMHG

## 2023-12-04 DIAGNOSIS — Z32.00 POSSIBLE PREGNANCY, NOT CONFIRMED: Primary | ICD-10-CM

## 2023-12-04 DIAGNOSIS — Z11.3 SCREENING EXAMINATION FOR STD (SEXUALLY TRANSMITTED DISEASE): ICD-10-CM

## 2023-12-04 DIAGNOSIS — Z01.419 ENCOUNTER FOR GYNECOLOGICAL EXAMINATION WITHOUT ABNORMAL FINDING: ICD-10-CM

## 2023-12-04 LAB
B-HCG UR QL: POSITIVE
CTP QC/QA: YES

## 2023-12-04 PROCEDURE — 87491 CHLMYD TRACH DNA AMP PROBE: CPT | Performed by: OBSTETRICS & GYNECOLOGY

## 2023-12-04 PROCEDURE — 99999PBSHW POCT URINE PREGNANCY: ICD-10-PCS | Mod: PBBFAC,,,

## 2023-12-04 PROCEDURE — 3008F BODY MASS INDEX DOCD: CPT | Mod: CPTII,,, | Performed by: OBSTETRICS & GYNECOLOGY

## 2023-12-04 PROCEDURE — 99999 PR PBB SHADOW E&M-EST. PATIENT-LVL III: ICD-10-PCS | Mod: PBBFAC,,, | Performed by: OBSTETRICS & GYNECOLOGY

## 2023-12-04 PROCEDURE — 87624 HPV HI-RISK TYP POOLED RSLT: CPT | Performed by: OBSTETRICS & GYNECOLOGY

## 2023-12-04 PROCEDURE — 3078F DIAST BP <80 MM HG: CPT | Mod: CPTII,,, | Performed by: OBSTETRICS & GYNECOLOGY

## 2023-12-04 PROCEDURE — 3008F PR BODY MASS INDEX (BMI) DOCUMENTED: ICD-10-PCS | Mod: CPTII,,, | Performed by: OBSTETRICS & GYNECOLOGY

## 2023-12-04 PROCEDURE — 99395 PREV VISIT EST AGE 18-39: CPT | Mod: S$PBB,,, | Performed by: OBSTETRICS & GYNECOLOGY

## 2023-12-04 PROCEDURE — 1159F PR MEDICATION LIST DOCUMENTED IN MEDICAL RECORD: ICD-10-PCS | Mod: CPTII,,, | Performed by: OBSTETRICS & GYNECOLOGY

## 2023-12-04 PROCEDURE — 1159F MED LIST DOCD IN RCRD: CPT | Mod: CPTII,,, | Performed by: OBSTETRICS & GYNECOLOGY

## 2023-12-04 PROCEDURE — 1160F PR REVIEW ALL MEDS BY PRESCRIBER/CLIN PHARMACIST DOCUMENTED: ICD-10-PCS | Mod: CPTII,,, | Performed by: OBSTETRICS & GYNECOLOGY

## 2023-12-04 PROCEDURE — 81025 URINE PREGNANCY TEST: CPT | Mod: PBBFAC,PN | Performed by: OBSTETRICS & GYNECOLOGY

## 2023-12-04 PROCEDURE — 3074F SYST BP LT 130 MM HG: CPT | Mod: CPTII,,, | Performed by: OBSTETRICS & GYNECOLOGY

## 2023-12-04 PROCEDURE — 3078F PR MOST RECENT DIASTOLIC BLOOD PRESSURE < 80 MM HG: ICD-10-PCS | Mod: CPTII,,, | Performed by: OBSTETRICS & GYNECOLOGY

## 2023-12-04 PROCEDURE — 99395 PR PREVENTIVE VISIT,EST,18-39: ICD-10-PCS | Mod: S$PBB,,, | Performed by: OBSTETRICS & GYNECOLOGY

## 2023-12-04 PROCEDURE — 99213 OFFICE O/P EST LOW 20 MIN: CPT | Mod: PBBFAC,PN | Performed by: OBSTETRICS & GYNECOLOGY

## 2023-12-04 PROCEDURE — 1160F RVW MEDS BY RX/DR IN RCRD: CPT | Mod: CPTII,,, | Performed by: OBSTETRICS & GYNECOLOGY

## 2023-12-04 PROCEDURE — 99999 PR PBB SHADOW E&M-EST. PATIENT-LVL III: CPT | Mod: PBBFAC,,, | Performed by: OBSTETRICS & GYNECOLOGY

## 2023-12-04 PROCEDURE — 88175 CYTOPATH C/V AUTO FLUID REDO: CPT | Performed by: OBSTETRICS & GYNECOLOGY

## 2023-12-04 PROCEDURE — 99999PBSHW POCT URINE PREGNANCY: Mod: PBBFAC,,,

## 2023-12-04 PROCEDURE — 3074F PR MOST RECENT SYSTOLIC BLOOD PRESSURE < 130 MM HG: ICD-10-PCS | Mod: CPTII,,, | Performed by: OBSTETRICS & GYNECOLOGY

## 2023-12-04 NOTE — PROGRESS NOTES
HISTORY OF PRESENT ILLNESS:    Sean rTujillo is a 33 y.o. female, , Patient's last menstrual period was 2023.,  presents for a routine exam and has no complaints.  LMP 2023 ) +UPT       The patient participates in regular exercise: yes.    The patient does not smoke.    The patient wears seatbelts.     Pt denies any domestic violence.    History reviewed. No pertinent past medical history.    History reviewed. No pertinent surgical history.    MEDICATIONS AND ALLERGIES:      Current Outpatient Medications:     dexAMETHasone (DECADRON) 4 MG Tab, Take 1 tablet (4 mg total) by mouth every 12 (twelve) hours as needed (urticaria). (Patient not taking: Reported on 2023), Disp: 20 tablet, Rfl: 1    ibuprofen (ADVIL,MOTRIN) 800 MG tablet, Take 800 mg by mouth every 6 (six) hours as needed., Disp: , Rfl:     levocetirizine (XYZAL) 5 MG tablet, Take 1 tablet (5 mg total) by mouth every evening. (Patient not taking: Reported on 2023), Disp: 30 tablet, Rfl: 5    prednisoLONE acetate (PRED FORTE) 1 % DrpS, , Disp: , Rfl:     Review of patient's allergies indicates:  No Known Allergies    Family History   Problem Relation Age of Onset    Breast cancer Neg Hx     Ovarian cancer Neg Hx        Social History     Socioeconomic History    Marital status:    Tobacco Use    Smoking status: Never    Smokeless tobacco: Never   Substance and Sexual Activity    Alcohol use: Not Currently    Drug use: Never    Sexual activity: Yes     Partners: Male     Birth control/protection: None       COMPREHENSIVE GYN HISTORY:  PAP History: Denies abnormal Paps.  Infection History: Denies STDs. Denies PID.  Benign History: Denies uterine fibroids. Denies ovarian cysts. Denies endometriosis. Denies other conditions.  Cancer History: Denies cervical cancer. Denies uterine cancer or hyperplasia. Denies ovarian cancer. Denies vulvar cancer or pre-cancer. Denies vaginal cancer or pre-cancer. Denies breast cancer. Denies  "colon cancer.  Sexual Activity History: Reports currently being sexually active  Menstrual History: Monthly. Mod then light flow.   Dysmenorrhea History: Reports mild dysmenorrhea.       ROS:  GENERAL: No weight changes. No swelling. No fatigue. No fever.  CARDIOVASCULAR: No chest pain. No shortness of breath. No leg cramps.   NEUROLOGICAL: No headaches. No vision changes.  BREASTS: No pain. No lumps. No discharge.  ABDOMEN: No pain. No nausea. No vomiting. No diarrhea. No constipation.  REPRODUCTIVE: No abnormal bleeding.   VULVA: No pain. No lesions. No itching.  VAGINA: No relaxation. No itching. No odor. No discharge. No lesions.  URINARY: No incontinence. No nocturia. No frequency. No dysuria.    /62 (BP Location: Right arm, Patient Position: Sitting)   Ht 5' 3" (1.6 m)   Wt 54.6 kg (120 lb 5.9 oz)   LMP 11/01/2023   BMI 21.32 kg/m²     PE:  APPEARANCE: Well nourished, well developed, in no acute distress.  AFFECT: WNL, alert and oriented x 3.  SKIN: No acne or hirsutism.  NECK: Neck symmetric, without masses or thyromegaly.  NODES: No inguinal, cervical, axillary or femoral lymph node enlargement.  CHEST: Good respiratory effort.   ABDOMEN: Soft. No tenderness or masses. No hepatosplenomegaly. No hernias.  BREASTS: Symmetrical, no skin changes, visible lesions, palpable masses or nipple discharge bilaterally.  PELVIC: External female genitalia without lesions.  Female hair distribution. Adequate perineal body, Normal urethral meatus. Vagina moist and well rugated without lesions or discharge.  No significant cystocele or rectocele present. Cervix pink without lesions, discharge or tenderness. Uterus is 4-6 week size, regular, mobile and nontender. Adnexa without masses or tenderness.  EXTREMITIES: No edema    DIAGNOSIS:  1. Possible pregnancy, not confirmed    2. Encounter for gynecological examination without abnormal finding        PLAN:    Orders Placed This Encounter    POCT Urine Pregnancy "       COUNSELING:  The patient was counseled today on:  [facility] PHONE NUMBER, [phone] (OPEN 24/7, LOCATED ON 6TH FLOOR OF HOSPITAL)  [address] PHONE NUMBER, [phone] (OPEN MON-FRI, 8a-5p)     1) Eat small frequent meals through the day versus three large meals  2) Try ginger ale or sprite to help settle the stomach   3) Eat crackers or dry toast before getting out of bed in the morning   4) Stay hydrated by drinking plenty of water-do not immediately eat or drink something after vomiting. Give your stomach a rest for 20-30 minutes. Slowly reintroduce ice chips, small sips water, crackers, etc.    5) Try OTC unisom and vitamin B6 together at night before bed. This can help with the nausea in the morning and is safe to use during pregnancy.    If you are unable to keep anything down and constantly vomiting for more that 24 hours, let the office know so that dehydration can be avoided. We would recommend being seen in the emergency department if this is the case.     FOLLOW-UP with me in 4 weeks

## 2023-12-06 LAB
C TRACH DNA SPEC QL NAA+PROBE: NOT DETECTED
N GONORRHOEA DNA SPEC QL NAA+PROBE: NOT DETECTED

## 2023-12-07 ENCOUNTER — TELEPHONE (OUTPATIENT)
Dept: OBSTETRICS AND GYNECOLOGY | Facility: CLINIC | Age: 33
End: 2023-12-07
Payer: MEDICAID

## 2023-12-07 ENCOUNTER — CLINICAL SUPPORT (OUTPATIENT)
Dept: OBSTETRICS AND GYNECOLOGY | Facility: CLINIC | Age: 33
End: 2023-12-07
Payer: MEDICAID

## 2023-12-07 ENCOUNTER — PATIENT MESSAGE (OUTPATIENT)
Dept: OBSTETRICS AND GYNECOLOGY | Facility: CLINIC | Age: 33
End: 2023-12-07
Payer: MEDICAID

## 2023-12-07 DIAGNOSIS — N91.2 AMENORRHEA: Primary | ICD-10-CM

## 2023-12-08 ENCOUNTER — PATIENT MESSAGE (OUTPATIENT)
Dept: OBSTETRICS AND GYNECOLOGY | Facility: CLINIC | Age: 33
End: 2023-12-08
Payer: MEDICAID

## 2023-12-15 ENCOUNTER — PATIENT MESSAGE (OUTPATIENT)
Dept: OBSTETRICS AND GYNECOLOGY | Facility: CLINIC | Age: 33
End: 2023-12-15
Payer: MEDICAID

## 2023-12-15 LAB
FINAL PATHOLOGIC DIAGNOSIS: NORMAL
Lab: NORMAL

## 2023-12-28 ENCOUNTER — HOSPITAL ENCOUNTER (OUTPATIENT)
Dept: PERINATAL CARE | Facility: OTHER | Age: 33
Discharge: HOME | End: 2023-12-28
Attending: OBSTETRICS & GYNECOLOGY
Payer: MEDICAID

## 2023-12-28 DIAGNOSIS — N91.2 AMENORRHEA: ICD-10-CM

## 2023-12-28 PROCEDURE — 76801 OB US < 14 WKS SINGLE FETUS: CPT | Mod: 26,,, | Performed by: OBSTETRICS & GYNECOLOGY

## 2023-12-28 PROCEDURE — 76801 US OB/GYN PROCEDURE (VIEWPOINT): ICD-10-PCS | Mod: 26,,, | Performed by: OBSTETRICS & GYNECOLOGY

## 2023-12-28 PROCEDURE — 76801 OB US < 14 WKS SINGLE FETUS: CPT

## 2024-01-02 ENCOUNTER — INITIAL PRENATAL (OUTPATIENT)
Dept: OBSTETRICS AND GYNECOLOGY | Facility: CLINIC | Age: 34
End: 2024-01-02
Payer: MEDICAID

## 2024-01-02 ENCOUNTER — LAB VISIT (OUTPATIENT)
Dept: LAB | Facility: OTHER | Age: 34
End: 2024-01-02
Attending: STUDENT IN AN ORGANIZED HEALTH CARE EDUCATION/TRAINING PROGRAM
Payer: MEDICAID

## 2024-01-02 VITALS — BODY MASS INDEX: 21.6 KG/M2 | SYSTOLIC BLOOD PRESSURE: 94 MMHG | DIASTOLIC BLOOD PRESSURE: 60 MMHG | WEIGHT: 121.94 LBS

## 2024-01-02 DIAGNOSIS — Z34.81 ENCOUNTER FOR SUPERVISION OF OTHER NORMAL PREGNANCY IN FIRST TRIMESTER: ICD-10-CM

## 2024-01-02 DIAGNOSIS — Z86.32 HISTORY OF GESTATIONAL DIABETES: ICD-10-CM

## 2024-01-02 DIAGNOSIS — Z34.81 ENCOUNTER FOR SUPERVISION OF OTHER NORMAL PREGNANCY IN FIRST TRIMESTER: Primary | ICD-10-CM

## 2024-01-02 LAB
ABO + RH BLD: NORMAL
BLD GP AB SCN CELLS X3 SERPL QL: NORMAL
HBV SURFACE AG SERPL QL IA: NORMAL
HCV AB SERPL QL IA: NORMAL
HIV 1+2 AB+HIV1 P24 AG SERPL QL IA: NORMAL
RPR SER QL: NORMAL
SPECIMEN OUTDATE: NORMAL

## 2024-01-02 PROCEDURE — 87086 URINE CULTURE/COLONY COUNT: CPT | Performed by: STUDENT IN AN ORGANIZED HEALTH CARE EDUCATION/TRAINING PROGRAM

## 2024-01-02 PROCEDURE — 99212 OFFICE O/P EST SF 10 MIN: CPT | Mod: PBBFAC,TH | Performed by: STUDENT IN AN ORGANIZED HEALTH CARE EDUCATION/TRAINING PROGRAM

## 2024-01-02 PROCEDURE — 87389 HIV-1 AG W/HIV-1&-2 AB AG IA: CPT | Performed by: STUDENT IN AN ORGANIZED HEALTH CARE EDUCATION/TRAINING PROGRAM

## 2024-01-02 PROCEDURE — 86803 HEPATITIS C AB TEST: CPT | Performed by: STUDENT IN AN ORGANIZED HEALTH CARE EDUCATION/TRAINING PROGRAM

## 2024-01-02 PROCEDURE — 86787 VARICELLA-ZOSTER ANTIBODY: CPT | Performed by: STUDENT IN AN ORGANIZED HEALTH CARE EDUCATION/TRAINING PROGRAM

## 2024-01-02 PROCEDURE — 86901 BLOOD TYPING SEROLOGIC RH(D): CPT | Performed by: STUDENT IN AN ORGANIZED HEALTH CARE EDUCATION/TRAINING PROGRAM

## 2024-01-02 PROCEDURE — 86592 SYPHILIS TEST NON-TREP QUAL: CPT | Performed by: STUDENT IN AN ORGANIZED HEALTH CARE EDUCATION/TRAINING PROGRAM

## 2024-01-02 PROCEDURE — 99203 OFFICE O/P NEW LOW 30 MIN: CPT | Mod: S$PBB,TH,, | Performed by: STUDENT IN AN ORGANIZED HEALTH CARE EDUCATION/TRAINING PROGRAM

## 2024-01-02 PROCEDURE — 36415 COLL VENOUS BLD VENIPUNCTURE: CPT | Performed by: STUDENT IN AN ORGANIZED HEALTH CARE EDUCATION/TRAINING PROGRAM

## 2024-01-02 PROCEDURE — 86762 RUBELLA ANTIBODY: CPT | Performed by: STUDENT IN AN ORGANIZED HEALTH CARE EDUCATION/TRAINING PROGRAM

## 2024-01-02 PROCEDURE — 99999 PR PBB SHADOW E&M-EST. PATIENT-LVL II: CPT | Mod: PBBFAC,,, | Performed by: STUDENT IN AN ORGANIZED HEALTH CARE EDUCATION/TRAINING PROGRAM

## 2024-01-02 PROCEDURE — 87340 HEPATITIS B SURFACE AG IA: CPT | Performed by: STUDENT IN AN ORGANIZED HEALTH CARE EDUCATION/TRAINING PROGRAM

## 2024-01-02 PROCEDURE — 83020 HEMOGLOBIN ELECTROPHORESIS: CPT | Performed by: STUDENT IN AN ORGANIZED HEALTH CARE EDUCATION/TRAINING PROGRAM

## 2024-01-03 ENCOUNTER — PATIENT MESSAGE (OUTPATIENT)
Dept: OBSTETRICS AND GYNECOLOGY | Facility: CLINIC | Age: 34
End: 2024-01-03
Payer: MEDICAID

## 2024-01-03 ENCOUNTER — TELEPHONE (OUTPATIENT)
Dept: OBSTETRICS AND GYNECOLOGY | Facility: CLINIC | Age: 34
End: 2024-01-03
Payer: MEDICAID

## 2024-01-03 PROBLEM — Z86.32 HISTORY OF GESTATIONAL DIABETES: Status: ACTIVE | Noted: 2024-01-03

## 2024-01-03 PROBLEM — Z34.91 ENCOUNTER FOR SUPERVISION OF NORMAL PREGNANCY IN FIRST TRIMESTER: Status: ACTIVE | Noted: 2024-01-03

## 2024-01-03 LAB
HGB A2 MFR BLD HPLC: 2.7 % (ref 2.2–3.2)
HGB FRACT BLD ELPH-IMP: NORMAL
HGB FRACT BLD ELPH-IMP: NORMAL
RUBV IGG SER-ACNC: 194 IU/ML
RUBV IGG SER-IMP: REACTIVE
VARICELLA INTERPRETATION: NEGATIVE
VARICELLA ZOSTER IGG: 55.46 AU/ML

## 2024-01-03 NOTE — TELEPHONE ENCOUNTER
Spoke with Frances from [facility] lab who notified me of clotted tube and stated pt will have to come in for a redraw.

## 2024-01-03 NOTE — PROGRESS NOTES
Pregnancy dating, labs, ultrasound reports, prenatal testing, and problem list; prior records and results; and available outside records were reviewed and updated in EMR.  Pertinent findings were noted below.    Reason for Visit   Initial Prenatal Visit    HPI   33 y.o., at 9w2d by Estimated Date of Delivery: 8/5/24  Visit conducted with assistance of language line .    Here for initial OB visit. Feeling OK. Having a little nausea and vomiting but mostly tolerating PO.  Had some dark brown spotting last week, no bright red bleeding. No cramping. Occasional headaches but sleep related. She is taking PNV.    This is her 3rd pregnancy. She has 2 girls, ages 6 and 9. Both uncomplicated SVDs. Reports having A1GDM in her second pregnancy. No other medical problems.      Exam   BP 94/60   Wt 55.3 kg (121 lb 14.6 oz)   LMP 11/01/2023   BMI 21.60 kg/m²     GENERAL: No acute distress  ABD: Gravid  BSUS: Active fetus, +FHR    Assessment and Plan   Encounter for supervision of other normal pregnancy in first trimester  -     Cancel: CBC Auto Differential; Future; Expected date: 01/02/2024  -     Hemoglobin Electrophoresis,Hgb A2 Erik.; Future; Expected date: 01/02/2024  -     Hepatitis B Surface Antigen; Future; Expected date: 01/02/2024  -     Hepatitis C Antibody; Future; Expected date: 01/02/2024  -     HIV 1/2 Ag/Ab (4th Gen); Future; Expected date: 01/02/2024  -     RPR; Future; Expected date: 01/02/2024  -     Rubella Antibody, IgG; Future; Expected date: 01/02/2024  -     Type & Screen; Future; Expected date: 01/02/2024  -     Urine culture  -     Varicella Zoster Antibody, IgG; Future; Expected date: 01/02/2024    History of gestational diabetes        -- 1T labs ordered. Will plan for early GTT between 14-18 weeks.  -- She desires cfDNA for genetic screening.  -- Discussed and enrolled in connected mom.  -- Recommend aspirin at 12 weeks for preE ppx.  -- RTC in 4 weeks.      Lucía Shanks MD

## 2024-01-03 NOTE — TELEPHONE ENCOUNTER
----- Message from Manju Condon MA sent at 1/3/2024  8:16 AM CST -----  Name of Who is Calling: Frances with [facility] lab calling on behalf of PAMELA PETERS [[id number]]                What is the request in detail: They are calling regarding pt CBC from yesterday. Please assist.                Can the clinic reply by [facility]: No                [facility]: [phone]

## 2024-01-04 ENCOUNTER — PATIENT MESSAGE (OUTPATIENT)
Dept: OBSTETRICS AND GYNECOLOGY | Facility: CLINIC | Age: 34
End: 2024-01-04
Payer: MEDICAID

## 2024-01-04 LAB — BACTERIA UR CULT: NO GROWTH

## 2024-01-25 ENCOUNTER — PATIENT MESSAGE (OUTPATIENT)
Dept: OBSTETRICS AND GYNECOLOGY | Facility: CLINIC | Age: 34
End: 2024-01-25
Payer: MEDICAID

## 2024-01-25 ENCOUNTER — TELEPHONE (OUTPATIENT)
Dept: OBSTETRICS AND GYNECOLOGY | Facility: CLINIC | Age: 34
End: 2024-01-25
Payer: MEDICAID

## 2024-01-26 ENCOUNTER — TELEPHONE (OUTPATIENT)
Dept: OBSTETRICS AND GYNECOLOGY | Facility: HOSPITAL | Age: 34
End: 2024-01-26
Payer: MEDICAID

## 2024-01-26 ENCOUNTER — TELEPHONE (OUTPATIENT)
Dept: OBSTETRICS AND GYNECOLOGY | Facility: CLINIC | Age: 34
End: 2024-01-26
Payer: MEDICAID

## 2024-01-26 DIAGNOSIS — O28.5 ABNORMAL GENETIC TEST DURING PREGNANCY: Primary | ICD-10-CM

## 2024-01-26 NOTE — TELEPHONE ENCOUNTER
Called to discuss NIPT results. Language line  [id number] assisted with call. Discussed possible monosomy X, recommend referral to genetics/MFM. All questions answered.       Lucía Shanks MD

## 2024-01-26 NOTE — TELEPHONE ENCOUNTER
----- Message from Mary Diez sent at 1/25/2024  2:27 PM CST -----  Judith calling form [facility] with finding on Maternity 21 suggestive m m recommend gentic counseling and diagnostic testing Judith can be reached at [phone]

## 2024-01-29 ENCOUNTER — TELEPHONE (OUTPATIENT)
Dept: MATERNAL FETAL MEDICINE | Facility: CLINIC | Age: 34
End: 2024-01-29
Payer: MEDICAID

## 2024-01-29 DIAGNOSIS — Z36.9 ENCOUNTER FOR FETAL ULTRASOUND: Primary | ICD-10-CM

## 2024-01-29 NOTE — TELEPHONE ENCOUNTER
LM on VM for pt to call MFM to schedule ultrasound and consult with assistance of  from language line

## 2024-01-30 ENCOUNTER — ROUTINE PRENATAL (OUTPATIENT)
Dept: OBSTETRICS AND GYNECOLOGY | Facility: CLINIC | Age: 34
End: 2024-01-30
Payer: MEDICAID

## 2024-01-30 VITALS
BODY MASS INDEX: 21.09 KG/M2 | WEIGHT: 119.06 LBS | DIASTOLIC BLOOD PRESSURE: 60 MMHG | SYSTOLIC BLOOD PRESSURE: 100 MMHG

## 2024-01-30 DIAGNOSIS — Z34.81 ENCOUNTER FOR SUPERVISION OF OTHER NORMAL PREGNANCY IN FIRST TRIMESTER: Primary | ICD-10-CM

## 2024-01-30 DIAGNOSIS — O28.5 ABNORMAL GENETIC TEST DURING PREGNANCY: ICD-10-CM

## 2024-01-30 DIAGNOSIS — Z86.32 HISTORY OF GESTATIONAL DIABETES: ICD-10-CM

## 2024-01-30 PROCEDURE — 99212 OFFICE O/P EST SF 10 MIN: CPT | Mod: S$PBB,TH,, | Performed by: STUDENT IN AN ORGANIZED HEALTH CARE EDUCATION/TRAINING PROGRAM

## 2024-01-30 PROCEDURE — 99999 PR PBB SHADOW E&M-EST. PATIENT-LVL II: CPT | Mod: PBBFAC,,, | Performed by: STUDENT IN AN ORGANIZED HEALTH CARE EDUCATION/TRAINING PROGRAM

## 2024-01-30 PROCEDURE — 99212 OFFICE O/P EST SF 10 MIN: CPT | Mod: PBBFAC,TH | Performed by: STUDENT IN AN ORGANIZED HEALTH CARE EDUCATION/TRAINING PROGRAM

## 2024-01-30 NOTE — PROGRESS NOTES
Pregnancy dating, labs, ultrasound reports, prenatal testing, and problem list; prior records and results; and available outside records were reviewed and updated in EMR.  Pertinent findings were noted below.    Reason for Visit   Well Woman    HPI   34 y.o., at 13w1d by Estimated Date of Delivery: 8/5/24    Here with her  for JERED. Feeling fine, just tired. Some nausea, no vomiting. Having a lot of heartburn. Occasional cramping, no bleeding.      Exam   /60   Wt 54 kg (119 lb 0.8 oz)   LMP 11/01/2023   BMI 21.09 kg/m²     GENERAL: No acute distress  ABD: Gravid  BSUS: Active fetus, +FHR    Assessment and Plan   Encounter for supervision of other normal pregnancy in first trimester    Abnormal genetic test during pregnancy    History of gestational diabetes  -     OB GLUCOSE SCREEN; Future; Expected date: 01/30/2024      -- Scheduled for have US and see MFM due to possible monosomy X on NIPT on Thursday. All questions answered.  -- Plan for early glucose screen at next visit given history of GDM.  -- RTC in 4 weeks. Precautions given.      Lucía Shanks MD

## 2024-02-01 ENCOUNTER — PROCEDURE VISIT (OUTPATIENT)
Dept: MATERNAL FETAL MEDICINE | Facility: CLINIC | Age: 34
End: 2024-02-01
Payer: MEDICAID

## 2024-02-01 ENCOUNTER — OFFICE VISIT (OUTPATIENT)
Dept: MATERNAL FETAL MEDICINE | Facility: CLINIC | Age: 34
End: 2024-02-01
Payer: MEDICAID

## 2024-02-01 VITALS
SYSTOLIC BLOOD PRESSURE: 107 MMHG | BODY MASS INDEX: 20.89 KG/M2 | DIASTOLIC BLOOD PRESSURE: 59 MMHG | WEIGHT: 117.94 LBS

## 2024-02-01 DIAGNOSIS — Z36.9 ENCOUNTER FOR FETAL ULTRASOUND: ICD-10-CM

## 2024-02-01 DIAGNOSIS — O28.5 ABNORMAL GENETIC TEST DURING PREGNANCY: ICD-10-CM

## 2024-02-01 PROCEDURE — 76817 TRANSVAGINAL US OBSTETRIC: CPT | Mod: 26,S$PBB,, | Performed by: OBSTETRICS & GYNECOLOGY

## 2024-02-01 PROCEDURE — 3078F DIAST BP <80 MM HG: CPT | Mod: CPTII,,, | Performed by: OBSTETRICS & GYNECOLOGY

## 2024-02-01 PROCEDURE — 99999 PR PBB SHADOW E&M-EST. PATIENT-LVL III: CPT | Mod: PBBFAC,,, | Performed by: OBSTETRICS & GYNECOLOGY

## 2024-02-01 PROCEDURE — 76817 TRANSVAGINAL US OBSTETRIC: CPT | Mod: PBBFAC | Performed by: OBSTETRICS & GYNECOLOGY

## 2024-02-01 PROCEDURE — 3008F BODY MASS INDEX DOCD: CPT | Mod: CPTII,,, | Performed by: OBSTETRICS & GYNECOLOGY

## 2024-02-01 PROCEDURE — 99417 PROLNG OP E/M EACH 15 MIN: CPT | Mod: S$PBB,,, | Performed by: OBSTETRICS & GYNECOLOGY

## 2024-02-01 PROCEDURE — 76801 OB US < 14 WKS SINGLE FETUS: CPT | Mod: 26,S$PBB,, | Performed by: OBSTETRICS & GYNECOLOGY

## 2024-02-01 PROCEDURE — 1159F MED LIST DOCD IN RCRD: CPT | Mod: CPTII,,, | Performed by: OBSTETRICS & GYNECOLOGY

## 2024-02-01 PROCEDURE — 99213 OFFICE O/P EST LOW 20 MIN: CPT | Mod: PBBFAC,TH | Performed by: OBSTETRICS & GYNECOLOGY

## 2024-02-01 PROCEDURE — 3074F SYST BP LT 130 MM HG: CPT | Mod: CPTII,,, | Performed by: OBSTETRICS & GYNECOLOGY

## 2024-02-01 PROCEDURE — 99215 OFFICE O/P EST HI 40 MIN: CPT | Mod: S$PBB,TH,, | Performed by: OBSTETRICS & GYNECOLOGY

## 2024-02-01 PROCEDURE — 76801 OB US < 14 WKS SINGLE FETUS: CPT | Mod: PBBFAC | Performed by: OBSTETRICS & GYNECOLOGY

## 2024-02-01 RX ORDER — FAMOTIDINE 40 MG/1
40 TABLET, FILM COATED ORAL DAILY
COMMUNITY

## 2024-02-01 NOTE — PROGRESS NOTES
Consultation  ==========  75 minutes of total time was spent on the encounter which included face-to-face time and non-face-to-face time preparing to see the patient,  obtaining and or reviewing separately obtained history, documenting clinical information in the electronic or other health record, independently  interpreting results (not separately reported) and communicating results to the patient and her partner or care coordination (not separately  reported). The consultation was conducted with the assistance of a [place]  provided by the [employer] service (Laurie; #[id number])    Referring MD or midlevel practitioner: Dr. Shanks    Indication for consultation: abnormal cfDNA testing    The patient's cell-free DNA testing was done by [employer] [employer] [facility] and showed an increased risk for fetal mosaic Monosomy X (PPV not given). Cell-free  DNA testing was low risk for trisomies 21, 18, and 13. We reviewed the basic concepts behind cell-free DNA testing and reviewed that it is a  screening test that cannot provide definitive answers regarding fetal chromosome abnormalities. We then discussed the possible explanations  for the results regarding Monosomy X. The results may be true positive and indicate fetal Monosomy X in full or mosaic form. Possible reasons  for a false positive result include confined placental mosaicism, maternal constitutional X chromosome abnormalities (likely mosaic, if present),  maternal age-related loss of an X chromosome, and limitations of the laboratory testing.    We discussed the range of phenotypic manifestations seen with Monosomy X and reviewed that ascertainment prenatally, particularly without  ultrasound abnormalities, is more likely to be associated with a milder phenotype. If the fetus has full Monosomy X, ultrasound abnormalities  are expected to be seen. With mosaic Monosomy X, ultrasound abnormalities are less likely to be identified prenatally. In this case, the only  abnormality  seen thus far is an enlarged NT.    We then discussed amniocentesis as the diagnostic test available prenatally to provide definitive answers regarding the presence of fetal  Monosomy X, as well as a full analysis of all chromosomes. The risk of ~ 1:900 for pregnancy loss associated with amniocentesis was  reviewed. If amniocentesis confirms fetal Monosomy X, we discussed the difficulties in predicting where on the spectrum of severity their child  may fall regarding manifestations of Monosomy X, particularly if the results show mosaic Monosomy X.    We reviewed that  diagnosis is available also, either on cord blood or via blood draw from the infant/child.    Follow-up ultrasound in 3 weeks will be scheduled to continue to evaluate fetal anatomy and to reassess the NT/NSF. At the end of our  discussion, the patient and her partner indicated that they would like more time to make a decision regarding whether to pursue  amniocentesis.  Will also plan for a fetal echo at 22 - 26 weeks.    Ultrasound Impression  =========  1. IUP - 13 and 3/7 weeks  2. Enlarged NT (3.1 mm)  3. No other fetal abnormalities seen within the limitations of a late first trimester anatomic assessment

## 2024-02-19 ENCOUNTER — PATIENT MESSAGE (OUTPATIENT)
Dept: OTHER | Facility: OTHER | Age: 34
End: 2024-02-19
Payer: MEDICAID

## 2024-02-26 ENCOUNTER — PROCEDURE VISIT (OUTPATIENT)
Dept: MATERNAL FETAL MEDICINE | Facility: CLINIC | Age: 34
End: 2024-02-26
Payer: MEDICAID

## 2024-02-26 ENCOUNTER — PATIENT MESSAGE (OUTPATIENT)
Dept: PEDIATRIC CARDIOLOGY | Facility: CLINIC | Age: 34
End: 2024-02-26
Payer: MEDICAID

## 2024-02-26 ENCOUNTER — PATIENT MESSAGE (OUTPATIENT)
Dept: OTHER | Facility: OTHER | Age: 34
End: 2024-02-26
Payer: MEDICAID

## 2024-02-26 ENCOUNTER — TELEPHONE (OUTPATIENT)
Dept: PEDIATRIC CARDIOLOGY | Facility: CLINIC | Age: 34
End: 2024-02-26
Payer: MEDICAID

## 2024-02-26 ENCOUNTER — OFFICE VISIT (OUTPATIENT)
Dept: MATERNAL FETAL MEDICINE | Facility: CLINIC | Age: 34
End: 2024-02-26
Payer: MEDICAID

## 2024-02-26 VITALS
BODY MASS INDEX: 21.25 KG/M2 | SYSTOLIC BLOOD PRESSURE: 97 MMHG | WEIGHT: 119.94 LBS | DIASTOLIC BLOOD PRESSURE: 53 MMHG | HEIGHT: 63 IN

## 2024-02-26 DIAGNOSIS — O28.5 ABNORMAL GENETIC TEST DURING PREGNANCY: Primary | ICD-10-CM

## 2024-02-26 DIAGNOSIS — Z36.89 ENCOUNTER FOR FETAL ANATOMIC SURVEY: ICD-10-CM

## 2024-02-26 DIAGNOSIS — Z36.2 ENCOUNTER FOR FOLLOW-UP ULTRASOUND OF FETAL ANATOMY: ICD-10-CM

## 2024-02-26 DIAGNOSIS — Z36.89 ENCOUNTER FOR ULTRASOUND TO ASSESS FETAL GROWTH: ICD-10-CM

## 2024-02-26 DIAGNOSIS — Z36.9 ENCOUNTER FOR FETAL ULTRASOUND: ICD-10-CM

## 2024-02-26 PROCEDURE — 3074F SYST BP LT 130 MM HG: CPT | Mod: CPTII,,, | Performed by: OBSTETRICS & GYNECOLOGY

## 2024-02-26 PROCEDURE — 76811 OB US DETAILED SNGL FETUS: CPT | Mod: PBBFAC | Performed by: OBSTETRICS & GYNECOLOGY

## 2024-02-26 PROCEDURE — 3078F DIAST BP <80 MM HG: CPT | Mod: CPTII,,, | Performed by: OBSTETRICS & GYNECOLOGY

## 2024-02-26 PROCEDURE — 99999 PR PBB SHADOW E&M-EST. PATIENT-LVL III: CPT | Mod: PBBFAC,,, | Performed by: OBSTETRICS & GYNECOLOGY

## 2024-02-26 PROCEDURE — 99214 OFFICE O/P EST MOD 30 MIN: CPT | Mod: 25,S$PBB,TH, | Performed by: OBSTETRICS & GYNECOLOGY

## 2024-02-26 PROCEDURE — 1159F MED LIST DOCD IN RCRD: CPT | Mod: CPTII,,, | Performed by: OBSTETRICS & GYNECOLOGY

## 2024-02-26 PROCEDURE — 76811 OB US DETAILED SNGL FETUS: CPT | Mod: 26,S$PBB,, | Performed by: OBSTETRICS & GYNECOLOGY

## 2024-02-26 PROCEDURE — 3008F BODY MASS INDEX DOCD: CPT | Mod: CPTII,,, | Performed by: OBSTETRICS & GYNECOLOGY

## 2024-02-26 PROCEDURE — 99213 OFFICE O/P EST LOW 20 MIN: CPT | Mod: PBBFAC,TH | Performed by: OBSTETRICS & GYNECOLOGY

## 2024-02-26 NOTE — ASSESSMENT & PLAN NOTE
See prior note by Dr. Camara for full counseling    Detailed ultrasound performed today, no abnormalities visualized as above. Counseled on these findings. Normal anatomic survey makes it unlikely that fetus is affected by 45X. However, unable to distinguish between mosaic 45,X and chromosomally normal fetus based on ultrasound findings.     Possibilities include: false + cfDNA with chromosomally normal fetus; confined placental mosaicism; fetus and placenta affected with mosaic 45,X; and less likely 45,X affected fetus.     Counseled on diagnostic testing in the form of amniocentesis which the patient declines today.     Recommendations:   Follow up ultrasound scheduled in 4 weeks for completion of anatomic survey   Fetal echocardiogram referral placed, recommend around 24 weeks  Recommend fetal growth assessment at 32 weeks  No further follow up MFM MD visit has been scheduled, if abnormal findings on follow up ultrasound or patient changes her mind and desires diagnostic testing, please refer back to MFM

## 2024-02-26 NOTE — PROGRESS NOTES
"Maternal Fetal Medicine follow up consult    SUBJECTIVE:     Sean Trujillo is a 34 y.o.  female with IUP at 17w0d who is seen in follow up consultation by MFM.  Pregnancy complications include:   No problems updated.    Previous notes reviewed.   No changes to medical, surgical, family, social, or obstetric history.    Interval history since last MFM visit: patient and family decline  today, patient understands and converses in English. Doing well without complaints.     Medications:  Current Outpatient Medications   Medication Instructions    famotidine (PEPCID) 40 mg, Oral, Daily    PNV,calcium 72-iron-folic acid (PRENATAL VITAMIN PLUS LOW IRON) 27 mg iron- 1 mg Tab 1 tablet, Oral, Daily       Care team members:  Lucía Shanks MD - Primary OB       OBJECTIVE:   BP (!) 97/53 (BP Location: Left arm, Patient Position: Sitting, BP Method: Large (Automatic))   Ht 5' 3" (1.6 m)   Wt 54.4 kg (119 lb 14.9 oz)   LMP 2023   BMI 21.24 kg/m²     Ultrasound performed. See viewpoint for full ultrasound report.  A detailed fetal anatomic ultrasound examination was performed for the following high risk indication: cfDNA + 45X mosaic.   No fetal structural malformations are identified; however, fetal imaging is incomplete today.   Fetal size today is consistent with established gestational age ( g).   Transabdominal cervical length is normal.   Placental location is anterior without evidence of previa.  A follow-up study will be scheduled to complete the fetal anatomic survey.       ASSESSMENT/PLAN:     34 y.o.  female with IUP at 17w0d    Abnormal genetic test during pregnancy  See prior note by Dr. Camara for full counseling    Detailed ultrasound performed today, no abnormalities visualized as above. Counseled on these findings. Normal anatomic survey makes it unlikely that fetus is affected by 45X. However, unable to distinguish between mosaic 45,X and chromosomally normal fetus based " on ultrasound findings.     Possibilities include: false + cfDNA with chromosomally normal fetus; confined placental mosaicism; fetus and placenta affected with mosaic 45,X; and less likely 45,X affected fetus.     Counseled on diagnostic testing in the form of amniocentesis which the patient declines today.     Recommendations:   Follow up ultrasound scheduled in 4 weeks for completion of anatomic survey   Fetal echocardiogram referral placed, recommend around 24 weeks  Recommend fetal growth assessment at 32 weeks  No further follow up MFM MD visit has been scheduled, if abnormal findings on follow up ultrasound or patient changes her mind and desires diagnostic testing, please refer back to MFM    30 minutes of total time spent on the encounter, which includes face to face time and non-face to face time preparing to see the patient (eg, review of tests), obtaining and/or reviewing separately obtained history, documenting clinical information in the electronic or other health record, independently interpreting results (not separately reported) and communicating results to the patient/family/caregiver, or care coordination (not separately reported).    Abhinav Guzmán MD  PGY 7  Maternal Fetal Medicine  [facility]

## 2024-02-26 NOTE — TELEPHONE ENCOUNTER
Call placed to patient with the assistance of Parish Poe  ( ID# [id number]) in an attempt to schedule her fetal echo. Call was answered by voicemail recording. VM left requesting a return call to [phone] to schedule her fetal echo.

## 2024-02-27 ENCOUNTER — ROUTINE PRENATAL (OUTPATIENT)
Dept: OBSTETRICS AND GYNECOLOGY | Facility: CLINIC | Age: 34
End: 2024-02-27
Payer: MEDICAID

## 2024-02-27 ENCOUNTER — PATIENT MESSAGE (OUTPATIENT)
Dept: ADMINISTRATIVE | Facility: OTHER | Age: 34
End: 2024-02-27
Payer: MEDICAID

## 2024-02-27 VITALS — DIASTOLIC BLOOD PRESSURE: 62 MMHG | BODY MASS INDEX: 21.36 KG/M2 | SYSTOLIC BLOOD PRESSURE: 90 MMHG | WEIGHT: 120.56 LBS

## 2024-02-27 DIAGNOSIS — Z86.32 HISTORY OF GESTATIONAL DIABETES: ICD-10-CM

## 2024-02-27 DIAGNOSIS — Z34.82 ENCOUNTER FOR SUPERVISION OF OTHER NORMAL PREGNANCY IN SECOND TRIMESTER: Primary | ICD-10-CM

## 2024-02-27 DIAGNOSIS — O28.5 ABNORMAL GENETIC TEST DURING PREGNANCY: ICD-10-CM

## 2024-02-27 PROCEDURE — 99999 PR PBB SHADOW E&M-EST. PATIENT-LVL II: CPT | Mod: PBBFAC,,, | Performed by: STUDENT IN AN ORGANIZED HEALTH CARE EDUCATION/TRAINING PROGRAM

## 2024-02-27 PROCEDURE — 99212 OFFICE O/P EST SF 10 MIN: CPT | Mod: S$PBB,TH,, | Performed by: STUDENT IN AN ORGANIZED HEALTH CARE EDUCATION/TRAINING PROGRAM

## 2024-02-27 PROCEDURE — 99212 OFFICE O/P EST SF 10 MIN: CPT | Mod: PBBFAC,TH | Performed by: STUDENT IN AN ORGANIZED HEALTH CARE EDUCATION/TRAINING PROGRAM

## 2024-02-27 RX ORDER — ONDANSETRON 4 MG/1
4 TABLET, ORALLY DISINTEGRATING ORAL EVERY 6 HOURS PRN
COMMUNITY
Start: 2024-01-05

## 2024-02-27 NOTE — PROGRESS NOTES
Pregnancy dating, labs, ultrasound reports, prenatal testing, and problem list; prior records and results; and available outside records were reviewed and updated in EMR.  Pertinent findings were noted below.    Reason for Visit   Routine Prenatal Visit    HPI   34 y.o., at 17w1d by Estimated Date of Delivery: 8/5/24    Here for JERED. Feeling good.  No N/V. No cramping or bleeding. No FM yet.      Exam   BP 90/62   Wt 54.7 kg (120 lb 9.5 oz)   LMP 11/01/2023   BMI 21.36 kg/m²     TWG: < 1 pound    GENERAL: No acute distress  ABD: Gravid    Assessment and Plan   Encounter for supervision of other normal pregnancy in second trimester    Abnormal genetic test during pregnancy    History of gestational diabetes      -- Anatomy scan reviewed, incomplete but no abnormal findings. Follow up US scheduled.  -- She saw MFM regarding possible monosomy X, declined amnio. No questions today.  -- Recommend early GTT given history of GDM. Will schedule.  -- RTC in 4 weeks.      Lucía Shanks MD

## 2024-03-04 ENCOUNTER — LAB VISIT (OUTPATIENT)
Dept: LAB | Facility: OTHER | Age: 34
End: 2024-03-04
Attending: STUDENT IN AN ORGANIZED HEALTH CARE EDUCATION/TRAINING PROGRAM
Payer: MEDICAID

## 2024-03-04 DIAGNOSIS — Z86.32 HISTORY OF GESTATIONAL DIABETES: ICD-10-CM

## 2024-03-04 LAB — GLUCOSE SERPL-MCNC: 133 MG/DL (ref 70–140)

## 2024-03-04 PROCEDURE — 36415 COLL VENOUS BLD VENIPUNCTURE: CPT | Performed by: STUDENT IN AN ORGANIZED HEALTH CARE EDUCATION/TRAINING PROGRAM

## 2024-03-04 PROCEDURE — 82950 GLUCOSE TEST: CPT | Performed by: STUDENT IN AN ORGANIZED HEALTH CARE EDUCATION/TRAINING PROGRAM

## 2024-03-18 ENCOUNTER — PATIENT MESSAGE (OUTPATIENT)
Dept: OTHER | Facility: OTHER | Age: 34
End: 2024-03-18
Payer: MEDICAID

## 2024-03-25 ENCOUNTER — PROCEDURE VISIT (OUTPATIENT)
Dept: MATERNAL FETAL MEDICINE | Facility: CLINIC | Age: 34
End: 2024-03-25
Payer: MEDICAID

## 2024-03-25 DIAGNOSIS — O28.5 ABNORMAL GENETIC TEST DURING PREGNANCY: ICD-10-CM

## 2024-03-25 DIAGNOSIS — Z36.2 ENCOUNTER FOR FOLLOW-UP ULTRASOUND OF FETAL ANATOMY: ICD-10-CM

## 2024-03-25 PROCEDURE — 76816 OB US FOLLOW-UP PER FETUS: CPT | Mod: PBBFAC | Performed by: OBSTETRICS & GYNECOLOGY

## 2024-03-26 ENCOUNTER — ROUTINE PRENATAL (OUTPATIENT)
Dept: OBSTETRICS AND GYNECOLOGY | Facility: CLINIC | Age: 34
End: 2024-03-26
Payer: MEDICAID

## 2024-03-26 VITALS
BODY MASS INDEX: 21.99 KG/M2 | WEIGHT: 124.13 LBS | SYSTOLIC BLOOD PRESSURE: 102 MMHG | DIASTOLIC BLOOD PRESSURE: 60 MMHG

## 2024-03-26 DIAGNOSIS — Z34.82 ENCOUNTER FOR SUPERVISION OF OTHER NORMAL PREGNANCY IN SECOND TRIMESTER: Primary | ICD-10-CM

## 2024-03-26 DIAGNOSIS — Z86.32 HISTORY OF GESTATIONAL DIABETES: ICD-10-CM

## 2024-03-26 DIAGNOSIS — O28.5 ABNORMAL GENETIC TEST DURING PREGNANCY: ICD-10-CM

## 2024-03-26 PROCEDURE — 99212 OFFICE O/P EST SF 10 MIN: CPT | Mod: PBBFAC,TH | Performed by: STUDENT IN AN ORGANIZED HEALTH CARE EDUCATION/TRAINING PROGRAM

## 2024-03-26 PROCEDURE — 99212 OFFICE O/P EST SF 10 MIN: CPT | Mod: S$PBB,TH,, | Performed by: STUDENT IN AN ORGANIZED HEALTH CARE EDUCATION/TRAINING PROGRAM

## 2024-03-26 PROCEDURE — 99999 PR PBB SHADOW E&M-EST. PATIENT-LVL II: CPT | Mod: PBBFAC,,, | Performed by: STUDENT IN AN ORGANIZED HEALTH CARE EDUCATION/TRAINING PROGRAM

## 2024-03-26 NOTE — PROGRESS NOTES
Pregnancy dating, labs, ultrasound reports, prenatal testing, and problem list; prior records and results; and available outside records were reviewed and updated in EMR.  Pertinent findings were noted below.    Reason for Visit   Routine Prenatal Visit    HPI   34 y.o., at 21w1d by Estimated Date of Delivery: 8/5/24    Here for EJRED. Doing well, happy about normal anatomy scan yesterday. Has had 2 isolated episodes of spotting since last visit. Very light.  No contractions, leakage of fluid. Reports normal fetal movement.      Exam   /60   Wt 56.3 kg (124 lb 1.9 oz)   LMP 11/01/2023   BMI 21.99 kg/m²     TWG: 3 pounds    GENERAL: No acute distress  ABD: Gravid    Assessment and Plan   Encounter for supervision of other normal pregnancy in second trimester  -     OB GLUCOSE SCREEN; Future; Expected date: 03/26/2024  -     CBC W/ AUTO DIFFERENTIAL; Future; Expected date: 03/26/2024    Abnormal genetic test during pregnancy    History of gestational diabetes      -- Anatomy scan reviewed, WNL.  -- Fetal echo scheduled for 4/8.  -- 2T labs at next visit.  -- RTC in 4 weeks.      Lucía Shanks MD

## 2024-04-08 ENCOUNTER — CLINICAL SUPPORT (OUTPATIENT)
Dept: PEDIATRIC CARDIOLOGY | Facility: CLINIC | Age: 34
End: 2024-04-08
Payer: MEDICAID

## 2024-04-08 ENCOUNTER — OFFICE VISIT (OUTPATIENT)
Dept: PEDIATRIC CARDIOLOGY | Facility: CLINIC | Age: 34
End: 2024-04-08
Attending: PEDIATRICS
Payer: MEDICAID

## 2024-04-08 VITALS
HEART RATE: 71 BPM | HEIGHT: 63 IN | DIASTOLIC BLOOD PRESSURE: 51 MMHG | BODY MASS INDEX: 22.44 KG/M2 | WEIGHT: 126.63 LBS | SYSTOLIC BLOOD PRESSURE: 84 MMHG

## 2024-04-08 DIAGNOSIS — O28.5 ABNORMAL GENETIC TEST DURING PREGNANCY: ICD-10-CM

## 2024-04-08 DIAGNOSIS — Z3A.23 23 WEEKS GESTATION OF PREGNANCY: Primary | ICD-10-CM

## 2024-04-08 DIAGNOSIS — Z36.83 ENCOUNTER FOR SCREENING FOR CONGENITAL CARDIAC ABNORMALITIES IN FETUS: ICD-10-CM

## 2024-04-08 PROCEDURE — 1159F MED LIST DOCD IN RCRD: CPT | Mod: CPTII,,, | Performed by: PEDIATRICS

## 2024-04-08 PROCEDURE — 76827 ECHO EXAM OF FETAL HEART: CPT | Mod: 26,S$PBB,, | Performed by: PEDIATRICS

## 2024-04-08 PROCEDURE — 76825 ECHO EXAM OF FETAL HEART: CPT | Mod: 26,S$PBB,, | Performed by: PEDIATRICS

## 2024-04-08 PROCEDURE — 99213 OFFICE O/P EST LOW 20 MIN: CPT | Mod: PBBFAC,25 | Performed by: PEDIATRICS

## 2024-04-08 PROCEDURE — 99999 PR PBB SHADOW E&M-EST. PATIENT-LVL III: CPT | Mod: PBBFAC,,, | Performed by: PEDIATRICS

## 2024-04-08 PROCEDURE — 3008F BODY MASS INDEX DOCD: CPT | Mod: CPTII,,, | Performed by: PEDIATRICS

## 2024-04-08 PROCEDURE — 3074F SYST BP LT 130 MM HG: CPT | Mod: CPTII,,, | Performed by: PEDIATRICS

## 2024-04-08 PROCEDURE — 3078F DIAST BP <80 MM HG: CPT | Mod: CPTII,,, | Performed by: PEDIATRICS

## 2024-04-08 PROCEDURE — 93325 DOPPLER ECHO COLOR FLOW MAPG: CPT | Mod: PBBFAC | Performed by: PEDIATRICS

## 2024-04-08 PROCEDURE — 99204 OFFICE O/P NEW MOD 45 MIN: CPT | Mod: 25,S$PBB,, | Performed by: PEDIATRICS

## 2024-04-08 PROCEDURE — 76827 ECHO EXAM OF FETAL HEART: CPT | Mod: PBBFAC | Performed by: PEDIATRICS

## 2024-04-08 PROCEDURE — 1160F RVW MEDS BY RX/DR IN RCRD: CPT | Mod: CPTII,,, | Performed by: PEDIATRICS

## 2024-04-08 PROCEDURE — 93325 DOPPLER ECHO COLOR FLOW MAPG: CPT | Mod: 26,S$PBB,, | Performed by: PEDIATRICS

## 2024-04-08 PROCEDURE — 76825 ECHO EXAM OF FETAL HEART: CPT | Mod: PBBFAC | Performed by: PEDIATRICS

## 2024-04-08 NOTE — LETTER
April 8, 2024        Lucía Shanks MD  [address] [address]             [facility] - Maternal Fetal Medicine ([place])  [address]LO[address] [facility]  [place] [address]  Phone: [phone]  Fax: [phone]   Patient: Sean Trujillo   MR Number: [id number]   YOB: 1990   Date of Visit: 4/8/2024       Dear Dr. Shanks:    Thank you for referring Sean Trujillo to me for evaluation. Attached you will find relevant portions of my assessment and plan of care.    If you have questions, please do not hesitate to call me. I look forward to following Sean Trujillo along with you.    Sincerely,      Floyd Anthony MD            CC  No Recipients    Enclosure

## 2024-04-08 NOTE — PROGRESS NOTES
I had the pleasure of evaluating Sean Trujillo who is now 34 y.o.  and carrying her 3rd pregnancy at 23 weeks gestation. 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  We were assisted in the visit today by the online translation services.  Ms. Trujillo has a good understanding of English and for the most part the translation service was not necessary.  She told me that she felt more comfortable having someone present just in case she had difficulties with any of the discussion.      Current Outpatient Medications:     PNV,calcium 72-iron-folic acid (PRENATAL VITAMIN PLUS LOW IRON) 27 mg iron- 1 mg Tab, Take 1 tablet by mouth once daily., Disp: , Rfl:     famotidine (PEPCID) 40 MG tablet, Take 40 mg by mouth once daily. (Patient not taking: Reported on 2024), Disp: , Rfl:     ondansetron (ZOFRAN-ODT) 4 MG TbDL, Take 4 mg by mouth every 6 (six) hours as needed. (Patient not taking: Reported on 2024), Disp: , Rfl:   Review of patient's allergies indicates:  No Known Allergies    Maternal factors increasing risk for congenital heart disease:  No Maternal risk factors for congenital heart disease identified.  Paternal factors increasing risk for congenital heart disease:  Unremarkable.  We need chart review    FETAL ECHOCARDIOGRAM (preliminary):  Normal fetal cardiac connections and function for estimated gestational age.  (Full report in electronic medical record)    I reviewed the strengths and weaknesses of fetal echocardiography including the insufficient sensitivity to rule out many septal defects, anomalies of pulmonary and systemic veins, arch anomalies, and some valvar abnormalities. I also discussed that  resolution of the ductus arteriosus and foramen ovale (normal fetal structures) cannot be assured by fetal evaluation. Finally, I reviewed today's  echocardiogram that demonstrates normal anatomical connections and function for the estimated gestational age. Within the limitations imposed by fetal physiology, I would expect a high probability that this infant will be born with a normal heart.    The primary reason for this consult as the high-risk for the infant to be born with Choi syndrome based on the results of the maternal cell free DNA testing.  Choi syndrome has a strong association with congenital heart disease particularly abnormalities of the aortic valve and coarctation of the aorta.  In addition, they frequently have abnormalities of the lymphatic system.  Although the echocardiogram today did not demonstrate any obvious abnormalities, the diagnosis of bicuspid aortic valve is unlikely to made in a fetus and coarctation of the aorta can frequently be difficult to diagnose in the presence of the ductus arteriosus.  Because of these concerns, I think it is best to plan for an echocardiogram of this infant to be performed electively before discharge from the nursery or sooner if there are any signs of  compromise.    I answered all the mother's questions.  She assured me that she had a good understanding of the information that we discussed.  I provided an information sheet reviewing the fetal physiology and how this compares to normal  physiology. This  includes a reiteration of the limitations of fetal echocardiography that I have discussed today.  I also suggested that she and her partner review the information related to fetal echocardiography available at the [employer] website and provided directions for accessing this site.  Although she relies on [employer] to help translate written English, her partner is fluent in English and this will allow him to have the opportunity to understand the strengths and weaknesses of the study that was performed today.  I encouraged her to call if she or her partner have any  questions regarding the information provided today  I have not scheduled another fetal evaluation; however, if questions arise later during gestation or after delivery, I would be happy to assist in any way. Ms. Trujillo is comfortable with the plan.      RECOMMENDATIONS:  Elective echocardiogram of the infant preferably after 24 hours to allow for normal  transitional physiology to proceed and before discharge from the nursery.  Sooner if there are any signs of hemodynamic compromise.              45 minutes of total time spent on the encounter, which includes face to face time and non-face to face time preparing to see the patient (eg, review of tests), obtaining and/or reviewing separately obtained history, documenting clinical information in the electronic or other health record, independently interpreting results (not separately reported) and communicating results to the patient/family/caregiver, or care coordination (not separately reported).

## 2024-04-15 ENCOUNTER — PATIENT MESSAGE (OUTPATIENT)
Dept: OTHER | Facility: OTHER | Age: 34
End: 2024-04-15
Payer: MEDICAID

## 2024-04-23 ENCOUNTER — ROUTINE PRENATAL (OUTPATIENT)
Dept: OBSTETRICS AND GYNECOLOGY | Facility: CLINIC | Age: 34
End: 2024-04-23
Payer: MEDICAID

## 2024-04-23 ENCOUNTER — LAB VISIT (OUTPATIENT)
Dept: LAB | Facility: OTHER | Age: 34
End: 2024-04-23
Attending: STUDENT IN AN ORGANIZED HEALTH CARE EDUCATION/TRAINING PROGRAM
Payer: MEDICAID

## 2024-04-23 VITALS — DIASTOLIC BLOOD PRESSURE: 58 MMHG | BODY MASS INDEX: 23.32 KG/M2 | WEIGHT: 131.63 LBS | SYSTOLIC BLOOD PRESSURE: 98 MMHG

## 2024-04-23 DIAGNOSIS — O28.5 ABNORMAL GENETIC TEST DURING PREGNANCY: ICD-10-CM

## 2024-04-23 DIAGNOSIS — Z34.82 ENCOUNTER FOR SUPERVISION OF OTHER NORMAL PREGNANCY IN SECOND TRIMESTER: ICD-10-CM

## 2024-04-23 DIAGNOSIS — Z34.82 ENCOUNTER FOR SUPERVISION OF OTHER NORMAL PREGNANCY IN SECOND TRIMESTER: Primary | ICD-10-CM

## 2024-04-23 DIAGNOSIS — Z86.32 HISTORY OF GESTATIONAL DIABETES: ICD-10-CM

## 2024-04-23 LAB
BASOPHILS # BLD AUTO: 0.04 K/UL (ref 0–0.2)
BASOPHILS NFR BLD: 0.3 % (ref 0–1.9)
DIFFERENTIAL METHOD BLD: ABNORMAL
EOSINOPHIL # BLD AUTO: 0.1 K/UL (ref 0–0.5)
EOSINOPHIL NFR BLD: 0.8 % (ref 0–8)
ERYTHROCYTE [DISTWIDTH] IN BLOOD BY AUTOMATED COUNT: 12.8 % (ref 11.5–14.5)
GLUCOSE SERPL-MCNC: 148 MG/DL (ref 70–140)
HCT VFR BLD AUTO: 35.6 % (ref 37–48.5)
HGB BLD-MCNC: 11.6 G/DL (ref 12–16)
IMM GRANULOCYTES # BLD AUTO: 0.13 K/UL (ref 0–0.04)
IMM GRANULOCYTES NFR BLD AUTO: 1 % (ref 0–0.5)
LYMPHOCYTES # BLD AUTO: 2.4 K/UL (ref 1–4.8)
LYMPHOCYTES NFR BLD: 18 % (ref 18–48)
MCH RBC QN AUTO: 32.9 PG (ref 27–31)
MCHC RBC AUTO-ENTMCNC: 32.6 G/DL (ref 32–36)
MCV RBC AUTO: 101 FL (ref 82–98)
MONOCYTES # BLD AUTO: 0.6 K/UL (ref 0.3–1)
MONOCYTES NFR BLD: 4.8 % (ref 4–15)
NEUTROPHILS # BLD AUTO: 10 K/UL (ref 1.8–7.7)
NEUTROPHILS NFR BLD: 75.1 % (ref 38–73)
NRBC BLD-RTO: 0 /100 WBC
PLATELET # BLD AUTO: 252 K/UL (ref 150–450)
PMV BLD AUTO: 9 FL (ref 9.2–12.9)
RBC # BLD AUTO: 3.53 M/UL (ref 4–5.4)
WBC # BLD AUTO: 13.28 K/UL (ref 3.9–12.7)

## 2024-04-23 PROCEDURE — 36415 COLL VENOUS BLD VENIPUNCTURE: CPT | Performed by: STUDENT IN AN ORGANIZED HEALTH CARE EDUCATION/TRAINING PROGRAM

## 2024-04-23 PROCEDURE — 99212 OFFICE O/P EST SF 10 MIN: CPT | Mod: PBBFAC,TH | Performed by: STUDENT IN AN ORGANIZED HEALTH CARE EDUCATION/TRAINING PROGRAM

## 2024-04-23 PROCEDURE — 99999 PR PBB SHADOW E&M-EST. PATIENT-LVL II: CPT | Mod: PBBFAC,,, | Performed by: STUDENT IN AN ORGANIZED HEALTH CARE EDUCATION/TRAINING PROGRAM

## 2024-04-23 PROCEDURE — 99212 OFFICE O/P EST SF 10 MIN: CPT | Mod: S$PBB,TH,, | Performed by: STUDENT IN AN ORGANIZED HEALTH CARE EDUCATION/TRAINING PROGRAM

## 2024-04-23 PROCEDURE — 85025 COMPLETE CBC W/AUTO DIFF WBC: CPT | Performed by: STUDENT IN AN ORGANIZED HEALTH CARE EDUCATION/TRAINING PROGRAM

## 2024-04-23 PROCEDURE — 82950 GLUCOSE TEST: CPT | Performed by: STUDENT IN AN ORGANIZED HEALTH CARE EDUCATION/TRAINING PROGRAM

## 2024-04-23 NOTE — PROGRESS NOTES
Pregnancy dating, labs, ultrasound reports, prenatal testing, and problem list; prior records and results; and available outside records were reviewed and updated in EMR.  Pertinent findings were noted below.    Reason for Visit   Routine Prenatal Visit    HPI   34 y.o., at 25w1d by Estimated Date of Delivery: 24    Here for JERED. No contractions, vaginal bleeding, leakage of fluid. Reports normal fetal movement.      Exam   BP (!) 98/58   Wt 59.7 kg (131 lb 9.8 oz)   LMP 2023   BMI 23.32 kg/m²     TW pounds    GENERAL: No acute distress  ABD: Gravid    Assessment and Plan   Encounter for supervision of other normal pregnancy in second trimester    Abnormal genetic test during pregnancy    History of gestational diabetes      -- Fetal echo reviewed, WNL. Will need  echo after delivery.  -- 2T labs today.  -- Growth scan scheduled for 6/10.  -- RTC in 4 weeks.      Lucía Shanks MD

## 2024-04-24 ENCOUNTER — TELEPHONE (OUTPATIENT)
Dept: OBSTETRICS AND GYNECOLOGY | Facility: CLINIC | Age: 34
End: 2024-04-24
Payer: MEDICAID

## 2024-04-24 NOTE — TELEPHONE ENCOUNTER
----- Message from Lucía Shanks MD sent at 4/23/2024  4:07 PM CDT -----  Please schedule 3 hour.  ----- Message -----  From: [facility]  Sent: 4/23/2024  12:25 PM CDT  To: Lucía Shanks MD

## 2024-04-25 ENCOUNTER — TELEPHONE (OUTPATIENT)
Dept: OBSTETRICS AND GYNECOLOGY | Facility: CLINIC | Age: 34
End: 2024-04-25
Payer: MEDICAID

## 2024-04-25 DIAGNOSIS — O99.810 ABNORMAL GLUCOSE AFFECTING PREGNANCY: Primary | ICD-10-CM

## 2024-04-25 NOTE — TELEPHONE ENCOUNTER
----- Message from Zoila Orlando sent at 4/24/2024  4:31 PM CDT -----  Who Called:PAMELA PETERS [[id number]]              Who Left Message for Patient:Nani Alejandre MA              Does the patient know what this is regarding? schedule 3 hour              Best Call Back Number:[phone]              Additional Information: Pt returning phone call    Called ext and sent a chat

## 2024-04-29 ENCOUNTER — LAB VISIT (OUTPATIENT)
Dept: LAB | Facility: OTHER | Age: 34
End: 2024-04-29
Attending: STUDENT IN AN ORGANIZED HEALTH CARE EDUCATION/TRAINING PROGRAM
Payer: MEDICAID

## 2024-04-29 ENCOUNTER — PATIENT MESSAGE (OUTPATIENT)
Dept: OTHER | Facility: OTHER | Age: 34
End: 2024-04-29
Payer: MEDICAID

## 2024-04-29 DIAGNOSIS — O99.810 ABNORMAL GLUCOSE AFFECTING PREGNANCY: ICD-10-CM

## 2024-04-29 LAB
GLUCOSE SERPL-MCNC: 100 MG/DL
GLUCOSE SERPL-MCNC: 123 MG/DL
GLUCOSE SERPL-MCNC: 166 MG/DL
GLUCOSE SERPL-MCNC: 85 MG/DL (ref 70–110)

## 2024-04-29 PROCEDURE — 36415 COLL VENOUS BLD VENIPUNCTURE: CPT | Performed by: STUDENT IN AN ORGANIZED HEALTH CARE EDUCATION/TRAINING PROGRAM

## 2024-04-29 PROCEDURE — 82951 GLUCOSE TOLERANCE TEST (GTT): CPT | Performed by: STUDENT IN AN ORGANIZED HEALTH CARE EDUCATION/TRAINING PROGRAM

## 2024-04-29 PROCEDURE — 82952 GTT-ADDED SAMPLES: CPT | Performed by: STUDENT IN AN ORGANIZED HEALTH CARE EDUCATION/TRAINING PROGRAM

## 2024-05-13 ENCOUNTER — PATIENT MESSAGE (OUTPATIENT)
Dept: OTHER | Facility: OTHER | Age: 34
End: 2024-05-13
Payer: MEDICAID

## 2024-05-22 ENCOUNTER — ROUTINE PRENATAL (OUTPATIENT)
Dept: OBSTETRICS AND GYNECOLOGY | Facility: CLINIC | Age: 34
End: 2024-05-22
Payer: MEDICAID

## 2024-05-22 VITALS — SYSTOLIC BLOOD PRESSURE: 98 MMHG | WEIGHT: 138.25 LBS | DIASTOLIC BLOOD PRESSURE: 60 MMHG | BODY MASS INDEX: 24.49 KG/M2

## 2024-05-22 DIAGNOSIS — O28.5 ABNORMAL GENETIC TEST DURING PREGNANCY: ICD-10-CM

## 2024-05-22 DIAGNOSIS — Z34.83 ENCOUNTER FOR SUPERVISION OF OTHER NORMAL PREGNANCY IN THIRD TRIMESTER: Primary | ICD-10-CM

## 2024-05-22 PROBLEM — Z34.93 ENCOUNTER FOR SUPERVISION OF NORMAL PREGNANCY IN THIRD TRIMESTER: Status: ACTIVE | Noted: 2024-01-03

## 2024-05-22 PROCEDURE — 99212 OFFICE O/P EST SF 10 MIN: CPT | Mod: PBBFAC,TH | Performed by: STUDENT IN AN ORGANIZED HEALTH CARE EDUCATION/TRAINING PROGRAM

## 2024-05-22 PROCEDURE — 99212 OFFICE O/P EST SF 10 MIN: CPT | Mod: S$PBB,TH,, | Performed by: STUDENT IN AN ORGANIZED HEALTH CARE EDUCATION/TRAINING PROGRAM

## 2024-05-22 PROCEDURE — 99999 PR PBB SHADOW E&M-EST. PATIENT-LVL II: CPT | Mod: PBBFAC,,, | Performed by: STUDENT IN AN ORGANIZED HEALTH CARE EDUCATION/TRAINING PROGRAM

## 2024-05-22 NOTE — PROGRESS NOTES
Pregnancy dating, labs, ultrasound reports, prenatal testing, and problem list; prior records and results; and available outside records were reviewed and updated in EMR.  Pertinent findings were noted below.    Reason for Visit   Routine Prenatal Visit    HPI   34 y.o., at 29w2d by Estimated Date of Delivery: 24    Here for JERED. Feeling more tired. No contractions, vaginal bleeding, leakage of fluid. Reports normal fetal movement.      Exam   Wt 62.7 kg (138 lb 3.7 oz)   LMP 2023   BMI 24.49 kg/m²     TW pounds    GENERAL: No acute distress  ABD: Gravid    Assessment and Plan   Encounter for supervision of other normal pregnancy in third trimester    Abnormal genetic test during pregnancy    Other orders  -     VFC-Tdap (ADACEL) vaccine 0.5 mL      -- Recommend Tdap. Will schedule with next visit.  -- Growth US scheduled for 6/10.  -- RTC in 2 weeks.      Lucía Shanks MD

## 2024-05-27 ENCOUNTER — PATIENT MESSAGE (OUTPATIENT)
Dept: OTHER | Facility: OTHER | Age: 34
End: 2024-05-27
Payer: MEDICAID

## 2024-06-07 ENCOUNTER — TELEPHONE (OUTPATIENT)
Dept: OBSTETRICS AND GYNECOLOGY | Facility: CLINIC | Age: 34
End: 2024-06-07
Payer: MEDICAID

## 2024-06-10 ENCOUNTER — PROCEDURE VISIT (OUTPATIENT)
Dept: MATERNAL FETAL MEDICINE | Facility: CLINIC | Age: 34
End: 2024-06-10
Payer: MEDICAID

## 2024-06-10 ENCOUNTER — PATIENT MESSAGE (OUTPATIENT)
Dept: OTHER | Facility: OTHER | Age: 34
End: 2024-06-10
Payer: MEDICAID

## 2024-06-10 DIAGNOSIS — Z36.89 ENCOUNTER FOR ULTRASOUND TO ASSESS FETAL GROWTH: ICD-10-CM

## 2024-06-10 DIAGNOSIS — O28.5 ABNORMAL GENETIC TEST DURING PREGNANCY: ICD-10-CM

## 2024-06-10 PROCEDURE — 76816 OB US FOLLOW-UP PER FETUS: CPT | Mod: PBBFAC | Performed by: OBSTETRICS & GYNECOLOGY

## 2024-06-11 ENCOUNTER — CLINICAL SUPPORT (OUTPATIENT)
Dept: OBSTETRICS AND GYNECOLOGY | Facility: CLINIC | Age: 34
End: 2024-06-11
Payer: MEDICAID

## 2024-06-11 ENCOUNTER — ROUTINE PRENATAL (OUTPATIENT)
Dept: OBSTETRICS AND GYNECOLOGY | Facility: CLINIC | Age: 34
End: 2024-06-11
Payer: MEDICAID

## 2024-06-11 VITALS
WEIGHT: 142.88 LBS | SYSTOLIC BLOOD PRESSURE: 99 MMHG | HEART RATE: 81 BPM | DIASTOLIC BLOOD PRESSURE: 56 MMHG | BODY MASS INDEX: 25.31 KG/M2

## 2024-06-11 DIAGNOSIS — Z3A.32 32 WEEKS GESTATION OF PREGNANCY: Primary | ICD-10-CM

## 2024-06-11 PROCEDURE — 99999 PR PBB SHADOW E&M-EST. PATIENT-LVL I: CPT | Mod: PBBFAC,,,

## 2024-06-11 PROCEDURE — 99999 PR PBB SHADOW E&M-EST. PATIENT-LVL III: CPT | Mod: PBBFAC,,,

## 2024-06-11 PROCEDURE — 90471 IMMUNIZATION ADMIN: CPT | Mod: PBBFAC

## 2024-06-11 PROCEDURE — 99211 OFF/OP EST MAY X REQ PHY/QHP: CPT | Mod: PBBFAC,25

## 2024-06-11 PROCEDURE — 99213 OFFICE O/P EST LOW 20 MIN: CPT | Mod: TH,S$PBB,,

## 2024-06-11 PROCEDURE — 99999PBSHW TDAP VACCINE GREATER THAN OR EQUAL TO 7YO IM: Mod: PBBFAC,,,

## 2024-06-11 PROCEDURE — 99213 OFFICE O/P EST LOW 20 MIN: CPT | Mod: PBBFAC,27,25

## 2024-06-11 PROCEDURE — 90715 TDAP VACCINE 7 YRS/> IM: CPT | Mod: PBBFAC

## 2024-06-11 NOTE — PATIENT INSTRUCTIONS
Call L & D after hours at [phone] for vaginal bleeding, leakage of fluids, contractions 4-5 in one hour, decreased fetal movements ( 10 kicks in 2 hours), headache not relieved by Tylenol, blurry vision, or temp of 100.4 or greater.  Begin doing fetal kick counts, at least 10 movements in 2 hours starting at 28 weeks gestation.  Keep next clinic appointment.

## 2024-06-11 NOTE — PROGRESS NOTES
Reason for visit: Routine Prenatal Visit      HPI:   34 y.o., at 32w1d by Estimated Date of Delivery: 24  Doing well today. Got TDAP. Had growth U/S yesterday.     - Contractions: none  - Bleeding: none  - Loss of fluid: none  - Fetal movement: yes  - Nausea: none  - Vomiting: none  - Headache: none    Reviewed:    Past medical, surgical, social, family, and obstetric history: Reviewed and updated in EMR.  Medications: Reviewed and updated in EMR.  Allergies: Patient has no known allergies.    Pregnancy dating, labs, ultrasound reports, prenatal testing, and problem list: Reviewed and updated in EMR.  Outside records: n/a  Independent interpretation of tests: n/a  Discussion with another healthcare professional: n/a      Vitals: BP (!) 99/56   Pulse 81   Wt 64.8 kg (142 lb 13.7 oz)   LMP 2023   BMI 25.31 kg/m²     Physical exam:  GENERAL: No acute distress  ABD: Gravid      Assessment and Plan:    There are no diagnoses linked to this encounter.    TDAP today   Reviewed growth US     labor precautions given  Follow-up: 2 weeks      I spent a total of 20 minutes on the day of the visit. This includes face to face time and non-face to face time preparing to see the patient (eg, review of tests), Obtaining and/or reviewing separately obtained history, Documenting clinical information in the electronic or other health record, Independently interpreting results and communicating results to the patient/family/caregiver, or Care coordination.

## 2024-06-26 ENCOUNTER — LAB VISIT (OUTPATIENT)
Dept: LAB | Facility: OTHER | Age: 34
End: 2024-06-26
Attending: STUDENT IN AN ORGANIZED HEALTH CARE EDUCATION/TRAINING PROGRAM
Payer: MEDICAID

## 2024-06-26 ENCOUNTER — ROUTINE PRENATAL (OUTPATIENT)
Dept: OBSTETRICS AND GYNECOLOGY | Facility: CLINIC | Age: 34
End: 2024-06-26
Payer: MEDICAID

## 2024-06-26 VITALS — WEIGHT: 142.19 LBS | BODY MASS INDEX: 25.2 KG/M2 | DIASTOLIC BLOOD PRESSURE: 62 MMHG | SYSTOLIC BLOOD PRESSURE: 112 MMHG

## 2024-06-26 DIAGNOSIS — Z86.32 HISTORY OF GESTATIONAL DIABETES: ICD-10-CM

## 2024-06-26 DIAGNOSIS — Z34.83 ENCOUNTER FOR SUPERVISION OF OTHER NORMAL PREGNANCY IN THIRD TRIMESTER: Primary | ICD-10-CM

## 2024-06-26 DIAGNOSIS — Z34.83 ENCOUNTER FOR SUPERVISION OF OTHER NORMAL PREGNANCY IN THIRD TRIMESTER: ICD-10-CM

## 2024-06-26 DIAGNOSIS — O28.5 ABNORMAL GENETIC TEST DURING PREGNANCY: ICD-10-CM

## 2024-06-26 LAB
BASOPHILS # BLD AUTO: 0.04 K/UL (ref 0–0.2)
BASOPHILS NFR BLD: 0.3 % (ref 0–1.9)
DIFFERENTIAL METHOD BLD: ABNORMAL
EOSINOPHIL # BLD AUTO: 0.1 K/UL (ref 0–0.5)
EOSINOPHIL NFR BLD: 0.7 % (ref 0–8)
ERYTHROCYTE [DISTWIDTH] IN BLOOD BY AUTOMATED COUNT: 13 % (ref 11.5–14.5)
HCT VFR BLD AUTO: 36.9 % (ref 37–48.5)
HGB BLD-MCNC: 11.9 G/DL (ref 12–16)
HIV 1+2 AB+HIV1 P24 AG SERPL QL IA: NEGATIVE
IMM GRANULOCYTES # BLD AUTO: 0.24 K/UL (ref 0–0.04)
IMM GRANULOCYTES NFR BLD AUTO: 1.9 % (ref 0–0.5)
LYMPHOCYTES # BLD AUTO: 2 K/UL (ref 1–4.8)
LYMPHOCYTES NFR BLD: 15.8 % (ref 18–48)
MCH RBC QN AUTO: 32.4 PG (ref 27–31)
MCHC RBC AUTO-ENTMCNC: 32.2 G/DL (ref 32–36)
MCV RBC AUTO: 101 FL (ref 82–98)
MONOCYTES # BLD AUTO: 0.6 K/UL (ref 0.3–1)
MONOCYTES NFR BLD: 4.9 % (ref 4–15)
NEUTROPHILS # BLD AUTO: 9.5 K/UL (ref 1.8–7.7)
NEUTROPHILS NFR BLD: 76.4 % (ref 38–73)
NRBC BLD-RTO: 0 /100 WBC
PLATELET # BLD AUTO: 221 K/UL (ref 150–450)
PMV BLD AUTO: 9.2 FL (ref 9.2–12.9)
RBC # BLD AUTO: 3.67 M/UL (ref 4–5.4)
TREPONEMA PALLIDUM IGG+IGM AB [PRESENCE] IN SERUM OR PLASMA BY IMMUNOASSAY: NONREACTIVE
WBC # BLD AUTO: 12.43 K/UL (ref 3.9–12.7)

## 2024-06-26 PROCEDURE — 99212 OFFICE O/P EST SF 10 MIN: CPT | Mod: PBBFAC,TH | Performed by: STUDENT IN AN ORGANIZED HEALTH CARE EDUCATION/TRAINING PROGRAM

## 2024-06-26 PROCEDURE — 86593 SYPHILIS TEST NON-TREP QUANT: CPT | Performed by: STUDENT IN AN ORGANIZED HEALTH CARE EDUCATION/TRAINING PROGRAM

## 2024-06-26 PROCEDURE — 85025 COMPLETE CBC W/AUTO DIFF WBC: CPT | Performed by: STUDENT IN AN ORGANIZED HEALTH CARE EDUCATION/TRAINING PROGRAM

## 2024-06-26 PROCEDURE — 36415 COLL VENOUS BLD VENIPUNCTURE: CPT | Performed by: STUDENT IN AN ORGANIZED HEALTH CARE EDUCATION/TRAINING PROGRAM

## 2024-06-26 PROCEDURE — 99999 PR PBB SHADOW E&M-EST. PATIENT-LVL II: CPT | Mod: PBBFAC,,, | Performed by: STUDENT IN AN ORGANIZED HEALTH CARE EDUCATION/TRAINING PROGRAM

## 2024-06-26 PROCEDURE — 87389 HIV-1 AG W/HIV-1&-2 AB AG IA: CPT | Performed by: STUDENT IN AN ORGANIZED HEALTH CARE EDUCATION/TRAINING PROGRAM

## 2024-06-26 NOTE — PROGRESS NOTES
Pregnancy dating, labs, ultrasound reports, prenatal testing, and problem list; prior records and results; and available outside records were reviewed and updated in EMR.  Pertinent findings were noted below.    Reason for Visit   Routine Prenatal Visit    HPI   34 y.o., at 34w2d by Estimated Date of Delivery: 24    Here with  for JERED. Starting to get harder to sleep, uncomfortable. +BHC, pelvic pressure. No regular contractions, vaginal bleeding, leakage of fluid. Reports normal fetal movement. Needs filling, has dental letter. States had to be induced for both of her girls. Wants IOL in 40th week if undelivered.      Exam   /62   Wt 64.5 kg (142 lb 3.2 oz)   LMP 2023   BMI 25.20 kg/m²     TW pounds    GENERAL: No acute distress  ABD: Gravid    Assessment and Plan   Encounter for supervision of other normal pregnancy in third trimester  -     CBC W/ AUTO DIFFERENTIAL; Future; Expected date: 2024  -     HIV 1/2 Ag/Ab (4th Gen); Future; Expected date: 2024  -     Treponema Pallidium Antibodies IgG, IgM; Future; Expected date: 2024    Abnormal genetic test during pregnancy    History of gestational diabetes      -- Growth US reviewed, WNL.  -- 3T labs today.  -- RTC in 2 weeks. Labor precautions given.      Lucía Shanks MD

## 2024-07-01 ENCOUNTER — PATIENT MESSAGE (OUTPATIENT)
Dept: OTHER | Facility: OTHER | Age: 34
End: 2024-07-01
Payer: MEDICAID

## 2024-07-09 ENCOUNTER — ROUTINE PRENATAL (OUTPATIENT)
Dept: OBSTETRICS AND GYNECOLOGY | Facility: CLINIC | Age: 34
End: 2024-07-09
Payer: MEDICAID

## 2024-07-09 VITALS
DIASTOLIC BLOOD PRESSURE: 49 MMHG | SYSTOLIC BLOOD PRESSURE: 92 MMHG | HEART RATE: 73 BPM | BODY MASS INDEX: 25.94 KG/M2 | WEIGHT: 146.38 LBS

## 2024-07-09 DIAGNOSIS — Z86.32 HISTORY OF GESTATIONAL DIABETES: ICD-10-CM

## 2024-07-09 DIAGNOSIS — O28.5 ABNORMAL GENETIC TEST DURING PREGNANCY: ICD-10-CM

## 2024-07-09 DIAGNOSIS — Z34.83 ENCOUNTER FOR SUPERVISION OF OTHER NORMAL PREGNANCY IN THIRD TRIMESTER: Primary | ICD-10-CM

## 2024-07-09 PROCEDURE — 99999 PR PBB SHADOW E&M-EST. PATIENT-LVL II: CPT | Mod: PBBFAC,,, | Performed by: STUDENT IN AN ORGANIZED HEALTH CARE EDUCATION/TRAINING PROGRAM

## 2024-07-09 PROCEDURE — 99212 OFFICE O/P EST SF 10 MIN: CPT | Mod: PBBFAC,TH | Performed by: STUDENT IN AN ORGANIZED HEALTH CARE EDUCATION/TRAINING PROGRAM

## 2024-07-09 PROCEDURE — 87081 CULTURE SCREEN ONLY: CPT | Performed by: STUDENT IN AN ORGANIZED HEALTH CARE EDUCATION/TRAINING PROGRAM

## 2024-07-09 RX ORDER — HYDROCORTISONE 25 MG/G
CREAM TOPICAL 2 TIMES DAILY
Qty: 30 G | Refills: 0 | Status: SHIPPED | OUTPATIENT
Start: 2024-07-09

## 2024-07-09 NOTE — PROGRESS NOTES
Pregnancy dating, labs, ultrasound reports, prenatal testing, and problem list; prior records and results; and available outside records were reviewed and updated in EMR.  Pertinent findings were noted below.    Reason for Visit   Routine Prenatal Visit    HPI   34 y.o., at 36w1d by Estimated Date of Delivery: 24    Here for JERED. +BHC. No regular contractions, vaginal bleeding, leakage of fluid. Reports normal fetal movement. Hemorrhoids, not bleeding just uncomfortable.      Exam   BP (!) 92/49   Pulse 73   Wt 66.4 kg (146 lb 6.2 oz)   LMP 2023   BMI 25.94 kg/m²     TW pounds    GENERAL: No acute distress  ABD: Gravid  Cervix: Closed, soft    Assessment and Plan   Encounter for supervision of other normal pregnancy in third trimester  -     STREP B SCREEN, VAGINAL / RECTAL    Abnormal genetic test during pregnancy    History of gestational diabetes    Other orders  -     anson lewis ointment; Apply topically 3 (three) times daily. Apply after feeding. Do not wash off. This compounded medication expires in 30 days.  Dispense: 30 g; Refill: 1  -     hydrocortisone 2.5 % cream; Apply topically 2 (two) times daily.  Dispense: 30 g; Refill: 0      -- GBS collected today.  -- Anusol sent for hemorrhoids.  -- RTC in 1 week. Labor precautions given.      Lucía Shanks MD

## 2024-07-11 LAB — BACTERIA SPEC AEROBE CULT: NORMAL

## 2024-07-16 ENCOUNTER — ROUTINE PRENATAL (OUTPATIENT)
Dept: OBSTETRICS AND GYNECOLOGY | Facility: CLINIC | Age: 34
End: 2024-07-16
Payer: MEDICAID

## 2024-07-16 VITALS
SYSTOLIC BLOOD PRESSURE: 109 MMHG | WEIGHT: 148.81 LBS | BODY MASS INDEX: 26.37 KG/M2 | DIASTOLIC BLOOD PRESSURE: 60 MMHG

## 2024-07-16 DIAGNOSIS — O28.5 ABNORMAL GENETIC TEST DURING PREGNANCY: ICD-10-CM

## 2024-07-16 DIAGNOSIS — Z86.32 HISTORY OF GESTATIONAL DIABETES: ICD-10-CM

## 2024-07-16 DIAGNOSIS — Z34.83 ENCOUNTER FOR SUPERVISION OF OTHER NORMAL PREGNANCY IN THIRD TRIMESTER: Primary | ICD-10-CM

## 2024-07-16 PROCEDURE — 99999 PR PBB SHADOW E&M-EST. PATIENT-LVL II: CPT | Mod: PBBFAC,,, | Performed by: STUDENT IN AN ORGANIZED HEALTH CARE EDUCATION/TRAINING PROGRAM

## 2024-07-16 PROCEDURE — 99212 OFFICE O/P EST SF 10 MIN: CPT | Mod: S$PBB,TH,, | Performed by: STUDENT IN AN ORGANIZED HEALTH CARE EDUCATION/TRAINING PROGRAM

## 2024-07-16 PROCEDURE — 99212 OFFICE O/P EST SF 10 MIN: CPT | Mod: PBBFAC,TH | Performed by: STUDENT IN AN ORGANIZED HEALTH CARE EDUCATION/TRAINING PROGRAM

## 2024-07-16 NOTE — PROGRESS NOTES
Pregnancy dating, labs, ultrasound reports, prenatal testing, and problem list; prior records and results; and available outside records were reviewed and updated in EMR.  Pertinent findings were noted below.    Reason for Visit   Routine Prenatal Visit    HPI   34 y.o., at 37w1d by Estimated Date of Delivery: 24    Here for JERED. Tired, not sleeping well. Had some spotting after cervical check last week. No contractions, vaginal bleeding, leakage of fluid. Reports normal fetal movement.      Exam   /60   Wt 67.5 kg (148 lb 13 oz)   LMP 2023   BMI 26.37 kg/m²     TW pounds    GENERAL: No acute distress  ABD: Gravid  Cervix: 1.5/50/-4    Assessment and Plan   Encounter for supervision of other normal pregnancy in third trimester    Abnormal genetic test during pregnancy    History of gestational diabetes      -- RTC in 1 week. Labor precautions given.      Lucía Shanks MD

## 2024-07-22 ENCOUNTER — ROUTINE PRENATAL (OUTPATIENT)
Dept: OBSTETRICS AND GYNECOLOGY | Facility: CLINIC | Age: 34
End: 2024-07-22
Payer: MEDICAID

## 2024-07-22 VITALS
DIASTOLIC BLOOD PRESSURE: 59 MMHG | SYSTOLIC BLOOD PRESSURE: 107 MMHG | WEIGHT: 149.69 LBS | BODY MASS INDEX: 26.52 KG/M2 | HEART RATE: 74 BPM

## 2024-07-22 DIAGNOSIS — Z34.83 ENCOUNTER FOR SUPERVISION OF OTHER NORMAL PREGNANCY IN THIRD TRIMESTER: Primary | ICD-10-CM

## 2024-07-22 DIAGNOSIS — O28.5 ABNORMAL GENETIC TEST DURING PREGNANCY: ICD-10-CM

## 2024-07-22 DIAGNOSIS — Z86.32 HISTORY OF GESTATIONAL DIABETES: ICD-10-CM

## 2024-07-22 PROCEDURE — 99213 OFFICE O/P EST LOW 20 MIN: CPT | Mod: PBBFAC,TH | Performed by: STUDENT IN AN ORGANIZED HEALTH CARE EDUCATION/TRAINING PROGRAM

## 2024-07-22 PROCEDURE — 99999 PR PBB SHADOW E&M-EST. PATIENT-LVL III: CPT | Mod: PBBFAC,,, | Performed by: STUDENT IN AN ORGANIZED HEALTH CARE EDUCATION/TRAINING PROGRAM

## 2024-07-22 NOTE — PROGRESS NOTES
Pregnancy dating, labs, ultrasound reports, prenatal testing, and problem list; prior records and results; and available outside records were reviewed and updated in EMR.  Pertinent findings were noted below.    Reason for Visit   Routine Prenatal Visit    HPI   34 y.o., at 38w0d by Estimated Date of Delivery: 24    Here for JERED. Increasing pelvic pressure. No contractions, vaginal bleeding, leakage of fluid. Reports normal fetal movement.      Exam   BP (!) 107/59   Pulse 74   Wt 67.9 kg (149 lb 11.1 oz)   LMP 2023   BMI 26.52 kg/m²     TW pounds    GENERAL: No acute distress  ABD: Gravid  Cervix: 250/-3    Assessment and Plan   Encounter for supervision of other normal pregnancy in third trimester    Abnormal genetic test during pregnancy    History of gestational diabetes      -- Consents for delivery discussed and signed today. Language line  assisted with this.  -- RTC in 1 week. Labor precautions given.      Lucía Shanks MD

## 2024-07-30 ENCOUNTER — ROUTINE PRENATAL (OUTPATIENT)
Dept: OBSTETRICS AND GYNECOLOGY | Facility: CLINIC | Age: 34
End: 2024-07-30
Payer: MEDICAID

## 2024-07-30 VITALS — WEIGHT: 149.5 LBS | DIASTOLIC BLOOD PRESSURE: 59 MMHG | SYSTOLIC BLOOD PRESSURE: 94 MMHG | BODY MASS INDEX: 26.48 KG/M2

## 2024-07-30 DIAGNOSIS — Z86.32 HISTORY OF GESTATIONAL DIABETES: ICD-10-CM

## 2024-07-30 DIAGNOSIS — O28.5 ABNORMAL GENETIC TEST DURING PREGNANCY: ICD-10-CM

## 2024-07-30 DIAGNOSIS — Z34.83 ENCOUNTER FOR SUPERVISION OF OTHER NORMAL PREGNANCY IN THIRD TRIMESTER: Primary | ICD-10-CM

## 2024-07-30 PROCEDURE — 99212 OFFICE O/P EST SF 10 MIN: CPT | Mod: S$PBB,TH,, | Performed by: STUDENT IN AN ORGANIZED HEALTH CARE EDUCATION/TRAINING PROGRAM

## 2024-07-30 PROCEDURE — 99212 OFFICE O/P EST SF 10 MIN: CPT | Mod: PBBFAC,TH | Performed by: STUDENT IN AN ORGANIZED HEALTH CARE EDUCATION/TRAINING PROGRAM

## 2024-07-30 PROCEDURE — 99999 PR PBB SHADOW E&M-EST. PATIENT-LVL II: CPT | Mod: PBBFAC,,, | Performed by: STUDENT IN AN ORGANIZED HEALTH CARE EDUCATION/TRAINING PROGRAM

## 2024-07-30 NOTE — PROGRESS NOTES
Pregnancy dating, labs, ultrasound reports, prenatal testing, and problem list; prior records and results; and available outside records were reviewed and updated in EMR.  Pertinent findings were noted below.    Reason for Visit   Routine Prenatal Visit    HPI   34 y.o., at 39w1d by Estimated Date of Delivery: 24    Here for JERED. No changes since last visit.  No regular contractions, vaginal bleeding, leakage of fluid. Reports normal fetal movement.      Exam   BP (!) 94/59   Wt 67.8 kg (149 lb 7.6 oz)   LMP 2023   BMI 26.48 kg/m²     TW pounds    GENERAL: No acute distress  ABD: Gravid  Cervix: 370/-3    Assessment and Plan   Encounter for supervision of other normal pregnancy in third trimester    Abnormal genetic test during pregnancy    History of gestational diabetes      --  going out of town for  this weekend. If undelivered at visit next week, wants to schedule IOL. Labor precautions given.      Lucía Shanks MD

## 2024-08-06 ENCOUNTER — ROUTINE PRENATAL (OUTPATIENT)
Dept: OBSTETRICS AND GYNECOLOGY | Facility: CLINIC | Age: 34
End: 2024-08-06
Payer: MEDICAID

## 2024-08-06 VITALS
SYSTOLIC BLOOD PRESSURE: 102 MMHG | DIASTOLIC BLOOD PRESSURE: 70 MMHG | WEIGHT: 154.75 LBS | BODY MASS INDEX: 27.42 KG/M2

## 2024-08-06 DIAGNOSIS — Z34.83 ENCOUNTER FOR SUPERVISION OF OTHER NORMAL PREGNANCY IN THIRD TRIMESTER: Primary | ICD-10-CM

## 2024-08-06 DIAGNOSIS — O28.5 ABNORMAL GENETIC TEST DURING PREGNANCY: ICD-10-CM

## 2024-08-06 DIAGNOSIS — Z86.32 HISTORY OF GESTATIONAL DIABETES: ICD-10-CM

## 2024-08-06 PROCEDURE — 99212 OFFICE O/P EST SF 10 MIN: CPT | Mod: S$PBB,TH,, | Performed by: STUDENT IN AN ORGANIZED HEALTH CARE EDUCATION/TRAINING PROGRAM

## 2024-08-06 PROCEDURE — 99212 OFFICE O/P EST SF 10 MIN: CPT | Mod: PBBFAC,TH | Performed by: STUDENT IN AN ORGANIZED HEALTH CARE EDUCATION/TRAINING PROGRAM

## 2024-08-06 PROCEDURE — 99999 PR PBB SHADOW E&M-EST. PATIENT-LVL II: CPT | Mod: PBBFAC,,, | Performed by: STUDENT IN AN ORGANIZED HEALTH CARE EDUCATION/TRAINING PROGRAM

## 2024-08-08 ENCOUNTER — TELEPHONE (OUTPATIENT)
Dept: OBSTETRICS AND GYNECOLOGY | Facility: OTHER | Age: 34
End: 2024-08-08
Payer: MEDICAID

## 2024-08-08 ENCOUNTER — ANESTHESIA (OUTPATIENT)
Dept: OBSTETRICS AND GYNECOLOGY | Facility: OTHER | Age: 34
End: 2024-08-08
Payer: MEDICAID

## 2024-08-08 ENCOUNTER — HOSPITAL ENCOUNTER (INPATIENT)
Facility: OTHER | Age: 34
LOS: 2 days | Discharge: HOME | End: 2024-08-10
Attending: OBSTETRICS & GYNECOLOGY | Admitting: OBSTETRICS & GYNECOLOGY
Payer: MEDICAID

## 2024-08-08 ENCOUNTER — ANESTHESIA EVENT (OUTPATIENT)
Dept: OBSTETRICS AND GYNECOLOGY | Facility: OTHER | Age: 34
End: 2024-08-08
Payer: MEDICAID

## 2024-08-08 DIAGNOSIS — Z3A.40 40 WEEKS GESTATION OF PREGNANCY: ICD-10-CM

## 2024-08-08 DIAGNOSIS — O42.90 LEAKAGE OF AMNIOTIC FLUID: ICD-10-CM

## 2024-08-08 DIAGNOSIS — Z34.90 ENCOUNTER FOR ELECTIVE INDUCTION OF LABOR: ICD-10-CM

## 2024-08-08 DIAGNOSIS — Z37.9 NORMAL LABOR: ICD-10-CM

## 2024-08-08 PROBLEM — N91.2 AMENORRHEA: Status: RESOLVED | Noted: 2023-12-28 | Resolved: 2024-08-08

## 2024-08-08 PROBLEM — Z34.93 ENCOUNTER FOR SUPERVISION OF NORMAL PREGNANCY IN THIRD TRIMESTER: Status: RESOLVED | Noted: 2024-01-03 | Resolved: 2024-08-08

## 2024-08-08 PROBLEM — Z3A.23 23 WEEKS GESTATION OF PREGNANCY: Status: RESOLVED | Noted: 2024-04-08 | Resolved: 2024-08-08

## 2024-08-08 LAB
ABO + RH BLD: NORMAL
BASOPHILS # BLD AUTO: 0.02 K/UL (ref 0–0.2)
BASOPHILS NFR BLD: 0.2 % (ref 0–1.9)
BLD GP AB SCN CELLS X3 SERPL QL: NORMAL
DIFFERENTIAL METHOD BLD: ABNORMAL
EOSINOPHIL # BLD AUTO: 0.1 K/UL (ref 0–0.5)
EOSINOPHIL NFR BLD: 0.4 % (ref 0–8)
ERYTHROCYTE [DISTWIDTH] IN BLOOD BY AUTOMATED COUNT: 13.3 % (ref 11.5–14.5)
HCT VFR BLD AUTO: 37.6 % (ref 37–48.5)
HGB BLD-MCNC: 12.5 G/DL (ref 12–16)
IMM GRANULOCYTES # BLD AUTO: 0.13 K/UL (ref 0–0.04)
IMM GRANULOCYTES NFR BLD AUTO: 1.1 % (ref 0–0.5)
LYMPHOCYTES # BLD AUTO: 1.7 K/UL (ref 1–4.8)
LYMPHOCYTES NFR BLD: 14.2 % (ref 18–48)
MCH RBC QN AUTO: 32.3 PG (ref 27–31)
MCHC RBC AUTO-ENTMCNC: 33.2 G/DL (ref 32–36)
MCV RBC AUTO: 97 FL (ref 82–98)
MONOCYTES # BLD AUTO: 0.9 K/UL (ref 0.3–1)
MONOCYTES NFR BLD: 7.6 % (ref 4–15)
NEUTROPHILS # BLD AUTO: 9.2 K/UL (ref 1.8–7.7)
NEUTROPHILS NFR BLD: 76.5 % (ref 38–73)
NRBC BLD-RTO: 0 /100 WBC
PLATELET # BLD AUTO: 228 K/UL (ref 150–450)
PMV BLD AUTO: 9.5 FL (ref 9.2–12.9)
RBC # BLD AUTO: 3.87 M/UL (ref 4–5.4)
SPECIMEN OUTDATE: NORMAL
TREPONEMA PALLIDUM IGG+IGM AB [PRESENCE] IN SERUM OR PLASMA BY IMMUNOASSAY: NONREACTIVE
WBC # BLD AUTO: 12.04 K/UL (ref 3.9–12.7)

## 2024-08-08 PROCEDURE — 99285 EMERGENCY DEPT VISIT HI MDM: CPT | Mod: 25,,, | Performed by: OBSTETRICS & GYNECOLOGY

## 2024-08-08 PROCEDURE — 59409 OBSTETRICAL CARE: CPT | Mod: AA,,, | Performed by: ANESTHESIOLOGY

## 2024-08-08 PROCEDURE — 0KQM0ZZ REPAIR PERINEUM MUSCLE, OPEN APPROACH: ICD-10-PCS | Performed by: STUDENT IN AN ORGANIZED HEALTH CARE EDUCATION/TRAINING PROGRAM

## 2024-08-08 PROCEDURE — 86900 BLOOD TYPING SEROLOGIC ABO: CPT | Performed by: OBSTETRICS & GYNECOLOGY

## 2024-08-08 PROCEDURE — 4A0HXCZ MEASUREMENT OF PRODUCTS OF CONCEPTION, CARDIAC RATE, EXTERNAL APPROACH: ICD-10-PCS | Performed by: STUDENT IN AN ORGANIZED HEALTH CARE EDUCATION/TRAINING PROGRAM

## 2024-08-08 PROCEDURE — 63600175 PHARM REV CODE 636 W HCPCS

## 2024-08-08 PROCEDURE — 85025 COMPLETE CBC W/AUTO DIFF WBC: CPT | Performed by: STUDENT IN AN ORGANIZED HEALTH CARE EDUCATION/TRAINING PROGRAM

## 2024-08-08 PROCEDURE — 25000003 PHARM REV CODE 250

## 2024-08-08 PROCEDURE — 59025 FETAL NON-STRESS TEST: CPT | Mod: 26,59,, | Performed by: OBSTETRICS & GYNECOLOGY

## 2024-08-08 PROCEDURE — C1751 CATH, INF, PER/CENT/MIDLINE: HCPCS | Performed by: ANESTHESIOLOGY

## 2024-08-08 PROCEDURE — 86850 RBC ANTIBODY SCREEN: CPT | Performed by: OBSTETRICS & GYNECOLOGY

## 2024-08-08 PROCEDURE — 72200006 HC VAGINAL DELIVERY LEVEL III

## 2024-08-08 PROCEDURE — 11000001 HC ACUTE MED/SURG PRIVATE ROOM

## 2024-08-08 PROCEDURE — 72100002 HC LABOR CARE, 1ST 8 HOURS

## 2024-08-08 PROCEDURE — 25000003 PHARM REV CODE 250: Performed by: ANESTHESIOLOGY

## 2024-08-08 PROCEDURE — 86901 BLOOD TYPING SEROLOGIC RH(D): CPT | Performed by: OBSTETRICS & GYNECOLOGY

## 2024-08-08 PROCEDURE — 63600175 PHARM REV CODE 636 W HCPCS: Mod: JG

## 2024-08-08 PROCEDURE — 76815 OB US LIMITED FETUS(S): CPT | Mod: 26,,, | Performed by: OBSTETRICS & GYNECOLOGY

## 2024-08-08 PROCEDURE — 62326 NJX INTERLAMINAR LMBR/SAC: CPT

## 2024-08-08 PROCEDURE — 51702 INSERT TEMP BLADDER CATH: CPT

## 2024-08-08 PROCEDURE — 72200005 HC VAGINAL DELIVERY LEVEL II

## 2024-08-08 PROCEDURE — 27200710 HC EPIDURAL INFUSION PUMP SET: Performed by: ANESTHESIOLOGY

## 2024-08-08 PROCEDURE — 86593 SYPHILIS TEST NON-TREP QUANT: CPT | Performed by: OBSTETRICS & GYNECOLOGY

## 2024-08-08 RX ORDER — CARBOPROST TROMETHAMINE 250 UG/ML
250 INJECTION, SOLUTION INTRAMUSCULAR
Status: DISCONTINUED | OUTPATIENT
Start: 2024-08-08 | End: 2024-08-10 | Stop reason: HOSPADM

## 2024-08-08 RX ORDER — OXYTOCIN 10 [USP'U]/ML
10 INJECTION, SOLUTION INTRAMUSCULAR; INTRAVENOUS ONCE AS NEEDED
Status: DISCONTINUED | OUTPATIENT
Start: 2024-08-08 | End: 2024-08-10 | Stop reason: HOSPADM

## 2024-08-08 RX ORDER — TRANEXAMIC ACID 10 MG/ML
1000 INJECTION, SOLUTION INTRAVENOUS EVERY 30 MIN PRN
Status: DISCONTINUED | OUTPATIENT
Start: 2024-08-08 | End: 2024-08-08

## 2024-08-08 RX ORDER — DIPHENOXYLATE HYDROCHLORIDE AND ATROPINE SULFATE 2.5; .025 MG/1; MG/1
2 TABLET ORAL EVERY 6 HOURS PRN
Status: DISCONTINUED | OUTPATIENT
Start: 2024-08-08 | End: 2024-08-08

## 2024-08-08 RX ORDER — SODIUM CHLORIDE 0.9 % (FLUSH) 0.9 %
2 SYRINGE (ML) INJECTION
Status: DISCONTINUED | OUTPATIENT
Start: 2024-08-08 | End: 2024-08-08

## 2024-08-08 RX ORDER — FENTANYL CITRATE 50 UG/ML
INJECTION, SOLUTION INTRAMUSCULAR; INTRAVENOUS
Status: DISCONTINUED | OUTPATIENT
Start: 2024-08-08 | End: 2024-08-08

## 2024-08-08 RX ORDER — SODIUM CHLORIDE 9 MG/ML
INJECTION, SOLUTION INTRAVENOUS
Status: DISCONTINUED | OUTPATIENT
Start: 2024-08-08 | End: 2024-08-08

## 2024-08-08 RX ORDER — LIDOCAINE HYDROCHLORIDE 10 MG/ML
10 INJECTION, SOLUTION INFILTRATION; PERINEURAL ONCE AS NEEDED
Status: DISCONTINUED | OUTPATIENT
Start: 2024-08-08 | End: 2024-08-08

## 2024-08-08 RX ORDER — METHYLERGONOVINE MALEATE 0.2 MG/ML
200 INJECTION INTRAVENOUS ONCE AS NEEDED
Status: DISCONTINUED | OUTPATIENT
Start: 2024-08-08 | End: 2024-08-10 | Stop reason: HOSPADM

## 2024-08-08 RX ORDER — FENTANYL/BUPIVACAINE/NS/PF 2MCG/ML-.1
PLASTIC BAG, INJECTION (ML) INJECTION
Status: COMPLETED
Start: 2024-08-08 | End: 2024-08-08

## 2024-08-08 RX ORDER — SIMETHICONE 80 MG
1 TABLET,CHEWABLE ORAL 4 TIMES DAILY PRN
Status: DISCONTINUED | OUTPATIENT
Start: 2024-08-08 | End: 2024-08-08

## 2024-08-08 RX ORDER — OXYTOCIN-SODIUM CHLORIDE 0.9% IV SOLN 30 UNIT/500ML 30-0.9/5 UT/ML-%
95 SOLUTION INTRAVENOUS ONCE AS NEEDED
Status: DISCONTINUED | OUTPATIENT
Start: 2024-08-08 | End: 2024-08-10 | Stop reason: HOSPADM

## 2024-08-08 RX ORDER — FENTANYL CITRATE 50 UG/ML
INJECTION, SOLUTION INTRAMUSCULAR; INTRAVENOUS
Status: COMPLETED
Start: 2024-08-08 | End: 2024-08-08

## 2024-08-08 RX ORDER — METHYLERGONOVINE MALEATE 0.2 MG/ML
200 INJECTION INTRAVENOUS ONCE AS NEEDED
Status: DISCONTINUED | OUTPATIENT
Start: 2024-08-08 | End: 2024-08-08

## 2024-08-08 RX ORDER — TRANEXAMIC ACID 10 MG/ML
1000 INJECTION, SOLUTION INTRAVENOUS EVERY 30 MIN PRN
Status: DISCONTINUED | OUTPATIENT
Start: 2024-08-08 | End: 2024-08-10 | Stop reason: HOSPADM

## 2024-08-08 RX ORDER — MISOPROSTOL 200 UG/1
800 TABLET ORAL ONCE AS NEEDED
Status: DISCONTINUED | OUTPATIENT
Start: 2024-08-08 | End: 2024-08-10 | Stop reason: HOSPADM

## 2024-08-08 RX ORDER — DOCUSATE SODIUM 100 MG/1
200 CAPSULE, LIQUID FILLED ORAL 2 TIMES DAILY PRN
Status: DISCONTINUED | OUTPATIENT
Start: 2024-08-08 | End: 2024-08-10 | Stop reason: HOSPADM

## 2024-08-08 RX ORDER — BUPIVACAINE HYDROCHLORIDE 2.5 MG/ML
INJECTION, SOLUTION INFILTRATION; PERINEURAL
Status: DISCONTINUED | OUTPATIENT
Start: 2024-08-08 | End: 2024-08-08

## 2024-08-08 RX ORDER — OXYTOCIN-SODIUM CHLORIDE 0.9% IV SOLN 30 UNIT/500ML 30-0.9/5 UT/ML-%
10 SOLUTION INTRAVENOUS ONCE AS NEEDED
Status: DISCONTINUED | OUTPATIENT
Start: 2024-08-08 | End: 2024-08-08

## 2024-08-08 RX ORDER — CALCIUM CARBONATE 200(500)MG
500 TABLET,CHEWABLE ORAL 3 TIMES DAILY PRN
Status: DISCONTINUED | OUTPATIENT
Start: 2024-08-08 | End: 2024-08-08

## 2024-08-08 RX ORDER — FENTANYL/BUPIVACAINE/NS/PF 2MCG/ML-.1
PLASTIC BAG, INJECTION (ML) INJECTION CONTINUOUS
Status: DISCONTINUED | OUTPATIENT
Start: 2024-08-08 | End: 2024-08-08

## 2024-08-08 RX ORDER — OXYTOCIN-SODIUM CHLORIDE 0.9% IV SOLN 30 UNIT/500ML 30-0.9/5 UT/ML-%
95 SOLUTION INTRAVENOUS ONCE AS NEEDED
Status: DISCONTINUED | OUTPATIENT
Start: 2024-08-08 | End: 2024-08-08

## 2024-08-08 RX ORDER — ACETAMINOPHEN 325 MG/1
650 TABLET ORAL EVERY 6 HOURS SCHEDULED
Status: DISCONTINUED | OUTPATIENT
Start: 2024-08-08 | End: 2024-08-10 | Stop reason: HOSPADM

## 2024-08-08 RX ORDER — OXYTOCIN-SODIUM CHLORIDE 0.9% IV SOLN 30 UNIT/500ML 30-0.9/5 UT/ML-%
0-32 SOLUTION INTRAVENOUS CONTINUOUS
Status: DISCONTINUED | OUTPATIENT
Start: 2024-08-08 | End: 2024-08-08

## 2024-08-08 RX ORDER — HYDROCORTISONE 25 MG/G
CREAM TOPICAL 3 TIMES DAILY PRN
Status: DISCONTINUED | OUTPATIENT
Start: 2024-08-08 | End: 2024-08-10 | Stop reason: HOSPADM

## 2024-08-08 RX ORDER — SODIUM CHLORIDE 0.9 % (FLUSH) 0.9 %
10 SYRINGE (ML) INJECTION
Status: DISCONTINUED | OUTPATIENT
Start: 2024-08-08 | End: 2024-08-10 | Stop reason: HOSPADM

## 2024-08-08 RX ORDER — FAMOTIDINE 20 MG/1
40 TABLET, FILM COATED ORAL DAILY
Status: DISCONTINUED | OUTPATIENT
Start: 2024-08-08 | End: 2024-08-08

## 2024-08-08 RX ORDER — OXYCODONE AND ACETAMINOPHEN 5; 325 MG/1; MG/1
1 TABLET ORAL EVERY 4 HOURS PRN
Status: DISCONTINUED | OUTPATIENT
Start: 2024-08-08 | End: 2024-08-09

## 2024-08-08 RX ORDER — DIPHENHYDRAMINE HCL 25 MG
25 CAPSULE ORAL EVERY 4 HOURS PRN
Status: DISCONTINUED | OUTPATIENT
Start: 2024-08-08 | End: 2024-08-10 | Stop reason: HOSPADM

## 2024-08-08 RX ORDER — PRENATAL WITH FERROUS FUM AND FOLIC ACID 3080; 920; 120; 400; 22; 1.84; 3; 20; 10; 1; 12; 200; 27; 25; 2 [IU]/1; [IU]/1; MG/1; [IU]/1; MG/1; MG/1; MG/1; MG/1; MG/1; MG/1; UG/1; MG/1; MG/1; MG/1; MG/1
1 TABLET ORAL DAILY
Status: DISCONTINUED | OUTPATIENT
Start: 2024-08-09 | End: 2024-08-10 | Stop reason: HOSPADM

## 2024-08-08 RX ORDER — FAMOTIDINE 10 MG/ML
20 INJECTION INTRAVENOUS ONCE
Status: DISCONTINUED | OUTPATIENT
Start: 2024-08-08 | End: 2024-08-08

## 2024-08-08 RX ORDER — OXYTOCIN-SODIUM CHLORIDE 0.9% IV SOLN 30 UNIT/500ML 30-0.9/5 UT/ML-%
95 SOLUTION INTRAVENOUS ONCE
Status: DISCONTINUED | OUTPATIENT
Start: 2024-08-08 | End: 2024-08-08

## 2024-08-08 RX ORDER — SODIUM CITRATE AND CITRIC ACID MONOHYDRATE 334; 500 MG/5ML; MG/5ML
30 SOLUTION ORAL ONCE
Status: DISCONTINUED | OUTPATIENT
Start: 2024-08-08 | End: 2024-08-08

## 2024-08-08 RX ORDER — ONDANSETRON 8 MG/1
8 TABLET, ORALLY DISINTEGRATING ORAL EVERY 8 HOURS PRN
Status: DISCONTINUED | OUTPATIENT
Start: 2024-08-08 | End: 2024-08-08

## 2024-08-08 RX ORDER — SIMETHICONE 80 MG
1 TABLET,CHEWABLE ORAL EVERY 6 HOURS PRN
Status: DISCONTINUED | OUTPATIENT
Start: 2024-08-08 | End: 2024-08-10 | Stop reason: HOSPADM

## 2024-08-08 RX ORDER — MISOPROSTOL 200 UG/1
800 TABLET ORAL ONCE AS NEEDED
Status: DISCONTINUED | OUTPATIENT
Start: 2024-08-08 | End: 2024-08-08

## 2024-08-08 RX ORDER — DIPHENOXYLATE HYDROCHLORIDE AND ATROPINE SULFATE 2.5; .025 MG/1; MG/1
2 TABLET ORAL EVERY 6 HOURS PRN
Status: DISCONTINUED | OUTPATIENT
Start: 2024-08-08 | End: 2024-08-10 | Stop reason: HOSPADM

## 2024-08-08 RX ORDER — OXYCODONE AND ACETAMINOPHEN 10; 325 MG/1; MG/1
1 TABLET ORAL EVERY 4 HOURS PRN
Status: DISCONTINUED | OUTPATIENT
Start: 2024-08-08 | End: 2024-08-09

## 2024-08-08 RX ORDER — CARBOPROST TROMETHAMINE 250 UG/ML
250 INJECTION, SOLUTION INTRAMUSCULAR
Status: DISCONTINUED | OUTPATIENT
Start: 2024-08-08 | End: 2024-08-08

## 2024-08-08 RX ORDER — SODIUM CHLORIDE, SODIUM LACTATE, POTASSIUM CHLORIDE, CALCIUM CHLORIDE 600; 310; 30; 20 MG/100ML; MG/100ML; MG/100ML; MG/100ML
INJECTION, SOLUTION INTRAVENOUS CONTINUOUS
Status: DISCONTINUED | OUTPATIENT
Start: 2024-08-08 | End: 2024-08-08

## 2024-08-08 RX ORDER — ONDANSETRON 8 MG/1
8 TABLET, ORALLY DISINTEGRATING ORAL EVERY 8 HOURS PRN
Status: DISCONTINUED | OUTPATIENT
Start: 2024-08-08 | End: 2024-08-10 | Stop reason: HOSPADM

## 2024-08-08 RX ORDER — OXYTOCIN-SODIUM CHLORIDE 0.9% IV SOLN 30 UNIT/500ML 30-0.9/5 UT/ML-%
10 SOLUTION INTRAVENOUS ONCE
Status: DISCONTINUED | OUTPATIENT
Start: 2024-08-08 | End: 2024-08-08

## 2024-08-08 RX ORDER — DIPHENHYDRAMINE HYDROCHLORIDE 50 MG/ML
25 INJECTION INTRAMUSCULAR; INTRAVENOUS EVERY 4 HOURS PRN
Status: DISCONTINUED | OUTPATIENT
Start: 2024-08-08 | End: 2024-08-10 | Stop reason: HOSPADM

## 2024-08-08 RX ORDER — IBUPROFEN 600 MG/1
600 TABLET ORAL EVERY 6 HOURS
Status: DISCONTINUED | OUTPATIENT
Start: 2024-08-08 | End: 2024-08-10 | Stop reason: HOSPADM

## 2024-08-08 RX ORDER — OXYTOCIN-SODIUM CHLORIDE 0.9% IV SOLN 30 UNIT/500ML 30-0.9/5 UT/ML-%
10 SOLUTION INTRAVENOUS ONCE AS NEEDED
Status: DISCONTINUED | OUTPATIENT
Start: 2024-08-08 | End: 2024-08-10 | Stop reason: HOSPADM

## 2024-08-08 RX ORDER — OXYTOCIN 10 [USP'U]/ML
10 INJECTION, SOLUTION INTRAMUSCULAR; INTRAVENOUS ONCE AS NEEDED
Status: DISCONTINUED | OUTPATIENT
Start: 2024-08-08 | End: 2024-08-08

## 2024-08-08 RX ADMIN — ACETAMINOPHEN 650 MG: 325 TABLET, FILM COATED ORAL at 11:08

## 2024-08-08 RX ADMIN — Medication 3 ML: at 12:08

## 2024-08-08 RX ADMIN — Medication 8 ML/HR: at 12:08

## 2024-08-08 RX ADMIN — OXYCODONE HYDROCHLORIDE AND ACETAMINOPHEN 1 TABLET: 5; 325 TABLET ORAL at 11:08

## 2024-08-08 RX ADMIN — OXYTOCIN 4 MILLI-UNITS/MIN: 10 INJECTION, SOLUTION INTRAMUSCULAR; INTRAVENOUS at 02:08

## 2024-08-08 RX ADMIN — SODIUM CHLORIDE, POTASSIUM CHLORIDE, SODIUM LACTATE AND CALCIUM CHLORIDE: 600; 310; 30; 20 INJECTION, SOLUTION INTRAVENOUS at 11:08

## 2024-08-08 RX ADMIN — FENTANYL CITRATE 100 MCG: 0.05 INJECTION, SOLUTION INTRAMUSCULAR; INTRAVENOUS at 05:08

## 2024-08-08 RX ADMIN — IBUPROFEN 600 MG: 600 TABLET, FILM COATED ORAL at 11:08

## 2024-08-08 RX ADMIN — BUPIVACAINE HYDROCHLORIDE 8 ML: 2.5 INJECTION, SOLUTION EPIDURAL; INFILTRATION; INTRACAUDAL; PERINEURAL at 04:08

## 2024-08-08 RX ADMIN — DOCUSATE SODIUM 200 MG: 100 CAPSULE, LIQUID FILLED ORAL at 11:08

## 2024-08-08 RX ADMIN — BUPIVACAINE HYDROCHLORIDE 4 ML: 2.5 INJECTION, SOLUTION INFILTRATION; PERINEURAL at 05:08

## 2024-08-08 RX ADMIN — SODIUM CHLORIDE, POTASSIUM CHLORIDE, SODIUM LACTATE AND CALCIUM CHLORIDE: 600; 310; 30; 20 INJECTION, SOLUTION INTRAVENOUS at 01:08

## 2024-08-08 NOTE — H&P
HISTORY AND PHYSICAL                                                OBSTETRICS          Subjective:       Sean Trujillo is a 34 y.o.  female with IUP at 40w3d weeks gestation who presents with leakage of fluid and was found to be ruptured on exam.    Patient reports contractions, denies vaginal bleeding, reports LOF.  Fetal Movement: normal.    This IUP is complicated by Abnormal Genetic Screen.    Review of Systems   Constitutional:  Negative for chills and fever.   HENT:  Negative for nasal congestion and mouth sores.    Eyes:  Negative for visual disturbance.   Respiratory:  Negative for cough and shortness of breath.    Cardiovascular:  Negative for chest pain and palpitations.   Gastrointestinal:  Negative for constipation, diarrhea, nausea and vomiting.   Genitourinary:  Negative for dysuria, pelvic pain, vaginal bleeding and vaginal discharge.   Musculoskeletal:  Negative for joint swelling.   Integumentary:  Negative for rash and breast tenderness.   Neurological:  Negative for syncope, numbness and headaches.   Psychiatric/Behavioral:  The patient is not nervous/anxious.    Breast: Negative for tenderness    PMHx:   Past Medical History:   Diagnosis Date    History of gestational diabetes        PSHx: History reviewed. No pertinent surgical history.    All: Review of patient's allergies indicates:  No Known Allergies    Meds:   Medications Prior to Admission   Medication Sig Dispense Refill Last Dose    famotidine (PEPCID) 40 MG tablet Take 40 mg by mouth once daily. (Patient not taking: Reported on 2024)       hydrocortisone 2.5 % cream Apply topically 2 (two) times daily. (Patient not taking: Reported on 2024) 30 g 0     anson lewis ointment Apply topically 3 (three) times daily. Apply after feeding. Do not wash off. This compounded medication expires in 30 days. (Patient not taking: Reported on 2024) 30 g 1     ondansetron (ZOFRAN-ODT) 4 MG TbDL Take 4 mg by mouth every 6 (six)  hours as needed. (Patient not taking: Reported on 2024)       PNV,calcium 72-iron-folic acid (PRENATAL VITAMIN PLUS LOW IRON) 27 mg iron- 1 mg Tab Take 1 tablet by mouth once daily.          SH:   Social History     Socioeconomic History    Marital status:    Tobacco Use    Smoking status: Never    Smokeless tobacco: Never   Substance and Sexual Activity    Alcohol use: Not Currently    Drug use: Never    Sexual activity: Yes     Partners: Male     Birth control/protection: None       FH:   Family History   Problem Relation Name Age of Onset    Breast cancer Neg Hx      Ovarian cancer Neg Hx      Colon cancer Neg Hx         OBHx:   OB History    Para Term  AB Living   3 2 2 0 0 2   SAB IAB Ectopic Multiple Live Births   0 0 0 0 2      # Outcome Date GA Lbr Bin/2nd Weight Sex Type Anes PTL Lv   3 Current            2 Term 17   3.232 kg (7 lb 2 oz) F Vag-Spont   JOE   1 Term 02/12/15   3.26 kg (7 lb 3 oz) F Vag-Spont   JOE       Objective:       LMP 2023     Pulse:  [66-75] 66  SpO2:  [97 %-99 %] 99 %  BP: (107)/(66) 107/66    General:   alert, appears stated age and cooperative, no apparent distress   HENT:  normocephalic, atraumatic   Eyes:  extraocular movements and conjunctivae normal   Neck:  supple, range of motion normal, no thyromegaly   Lungs:   no respiratory distress   Heart:   regular rate   Abdomen:  soft, non-tender, non-distended but gravid, no rebound or guarding    Extremities Trace pedal edema, negative erythema   FHT: 150, moderate BTBV, +accels, -decels;  Cat 1 (reassuring)                 TOCO: irregular   Presentations: cephalic by ultrasound   Cervix:     Dilation: 5    Effacement: 50%    Station:  -3    Consistency: medium    Position: anterior   Sterile Speculum Exam: mild pooling of fluid on exam    EFW by Leopold's: 6.5-7    Lab Review  Blood Type O POS  GBBS: negative  Rubella: Immune  RPR: negative  HIV: negative  HepB: negative       Assessment:        40w3d weeks gestation with leakage of fluid     Active Hospital Problems    Diagnosis  POA    *Encounter for elective induction of labor [Z34.90]  Not Applicable      Resolved Hospital Problems   No resolved problems to display.     Plan:   IOL   Risks, benefits, alternatives and possible complications have been discussed in detail with the patient.   - Consents signed and to chart  - Admit to Labor and Delivery unit   - Epidural per Anesthesia  - Draw CBC, T&S, Trep  - Notify Staff  - Pitocin Augmentation per protocol  - Recheck in 4 hrs or PRN  - EFW 1785 22% on 6/10/24  - Maternal pelvis proven 7lb3oz    2. cfDNA +45X Mosaic  - patient declined amnio  - normal anatomy scan   - fetal  echo     Post-Partum Hemorrhage risk - low    Carmel Mitchell  [facility]   OBGYN PGY1

## 2024-08-08 NOTE — ANESTHESIA PROCEDURE NOTES
CSE    Patient location during procedure: OB  Start time: 8/8/2024 12:14 PM  Timeout: 8/8/2024 12:14 PM  End time: 8/8/2024 12:32 PM    Reason for block: labor analgesia requested by patient and obstetrician    Staffing  Authorizing Provider: Toya Cerda MD  Performing Provider: Remington Lew MD    Staffing  Performed by: Remington Lew MD  Authorized by: Toya Cerda MD    Preanesthetic Checklist  Completed: patient identified, IV checked, site marked, risks and benefits discussed, surgical consent, monitors and equipment checked, pre-op evaluation and timeout performed  CSE  Patient position: sitting  Prep: ChloraPrep  Patient monitoring: heart rate, cardiac monitor and continuous pulse ox  Approach: midline  Spinal Needle  Needle type: Kev   Needle gauge: 25 G  Needle length: 3.5 in  Epidural Needle  Injection technique: VERA air  Needle type: Tuohy   Needle gauge: 18 G  Needle length: 3.5 in  Needle insertion depth: 5 cm  Location: L4-5  Needle localization: anatomical landmarks   Catheter  Catheter type: Airpush  Catheter size: 18 G  Catheter at skin depth: 9 cm  Test dose: lidocaine 1.5% with Epi 1-to-200,000  Test dose: 3 mL  Additional Documentation: incremental injection, negative aspiration for CSF and negative test dose

## 2024-08-08 NOTE — PROGRESS NOTES
"LABOR NOTE    S:  MD to bedside for routine cervical exam. Epidural working:  yes  No pt concerns at this time    O: /66   Pulse 64   Temp 98.2 °F (36.8 °C) (Oral)   Resp 18   Ht 5' 3" (1.6 m)   Wt 70.2 kg (154 lb 12.2 oz)   LMP 2023   SpO2 98%   Breastfeeding No   BMI 27.42 kg/m²     FHT: 140 bpm, moderate variability, +accels, -decels, Cat 1 (reassuring)  CTX: q 2 minutes  SVE: /-2    TIMELINE:  1430: /-2, AROM clear.  Original SROM likely fore bag.  Starting oxytocin  1630: 10100/+1    ASSESSMENT:  Final Active Diagnoses:    Diagnosis Date Noted POA    PRINCIPAL PROBLEM:  Encounter for elective induction of labor [Z34.90] 2024 Not Applicable      Problems Resolved During this Admission:     PLAN:    Continue Close Maternal/Fetal Monitoring  Pitocin Augmentation per protocol  Primary OB notified  Anticipate       Edgar Comer MD MS  OB/Gyn  PGY-2      "

## 2024-08-08 NOTE — Clinical Note
I certify that Inpatient services for greater than or equal to 2 midnights are medically necessary:: Yes   Transfer To (Destination): [facility] LABOR AND DELIVERY [[id number]]   Diagnosis: Encounter for elective induction of labor [[id number]]   Future Attending Provider: ANTON JENNINGS [[id number]]   Reason for IP Medical Treatment  (Clinical interventions that can only be accomplished in the IP setting? ) :: IOL   Estimated Length of Stay:: 2 midnights   Plans for Post-Acute care--if anticipated (pick the single best option):: A. No post acute care anticipated at this time

## 2024-08-08 NOTE — ANESTHESIA PREPROCEDURE EVALUATION
[facility]  Anesthesia Pre-Operative Evaluation     Patient Name: Sean Trujillo  YOB: 1990  MRN: [id number]  [facility]: [id number]      Code Status: Prior   Date of Procedure: 8/8/2024  Anesthesia: * No anesthesia type entered * Procedure: * No procedures listed *  Pre-Operative Diagnosis: * No pre-op diagnosis entered *  Proceduralist: * No surgeons found in log *          SUBJECTIVE:     Pre-operative evaluation for * No procedures listed *     08/08/2024    Sean Trujillo is a 34 y.o. female w/ gestational diabetes. Presents for induction of labor    Patient now presents for the above procedure(s).       Anticoagulants   Medication Route Frequency        ________________________________________  No results found for this or any previous visit.    ________________________________________    Prev airway: None documented.            LDA:        Peripheral IV - Single Lumen 08/08/24 1145 18 G Anterior;Distal;Left Forearm (Active)   Number of days: 0       Drips:    lactated ringers   Intravenous Continuous 125 mL/hr at 08/08/24 1159 New Bag at 08/08/24 1159    oxytocin  0-32 curt-units/min Intravenous Continuous           Patient Active Problem List   Diagnosis    Amenorrhea    Encounter for supervision of normal pregnancy in third trimester    History of gestational diabetes    Abnormal genetic test during pregnancy    23 weeks gestation of pregnancy    Encounter for elective induction of labor       Review of patient's allergies indicates:  No Known Allergies    Current Inpatient Medications:    famotidine  40 mg Oral Daily    oxytocin  10.0002 Units Intravenous Once    oxytocin  95 curt-units/min Intravenous Once       No current facility-administered medications on file prior to encounter.     Current Outpatient Medications on File Prior to Encounter   Medication Sig Dispense Refill    famotidine (PEPCID) 40 MG tablet Take 40 mg by mouth once daily.  (Patient not taking: Reported on 8/6/2024)      hydrocortisone 2.5 % cream Apply topically 2 (two) times daily. (Patient not taking: Reported on 7/16/2024) 30 g 0    jack lewis ointment Apply topically 3 (three) times daily. Apply after feeding. Do not wash off. This compounded medication expires in 30 days. (Patient not taking: Reported on 7/16/2024) 30 g 1    ondansetron (ZOFRAN-ODT) 4 MG TbDL Take 4 mg by mouth every 6 (six) hours as needed. (Patient not taking: Reported on 4/8/2024)      PNV,calcium 72-iron-folic acid (PRENATAL VITAMIN PLUS LOW IRON) 27 mg iron- 1 mg Tab Take 1 tablet by mouth once daily.         History reviewed. No pertinent surgical history.    Social History:  Tobacco Use: Low Risk  (8/6/2024)    Patient History     Smoking Tobacco Use: Never     Smokeless Tobacco Use: Never     Passive Exposure: Not on file       Alcohol Use: Not At Risk (1/22/2020)    AUDIT-C     Frequency of Alcohol Consumption: Never     Average Number of Drinks: Not on file     Frequency of Binge Drinking: Not on file       OBJECTIVE:     Vital Signs Range:  BMI Readings from Last 1 Encounters:   08/06/24 27.42 kg/m²               Significant Labs:        Component Value Date/Time    WBC 12.04 08/08/2024 1118    HGB 12.5 08/08/2024 1118    HCT 37.6 08/08/2024 1118     08/08/2024 1118     07/27/2023 1157    K 4.2 07/27/2023 1157     07/27/2023 1157    CO2 27 07/27/2023 1157    GLU 87 07/27/2023 1157    BUN 11 07/27/2023 1157    CREATININE 0.8 07/27/2023 1157    CALCIUM 9.4 07/27/2023 1157    ALBUMIN 4.2 07/27/2023 1157    PROT 7.5 07/27/2023 1157    ALKPHOS 54 (L) 07/27/2023 1157    BILITOT 0.6 07/27/2023 1157    AST 18 07/27/2023 1157    ALT 14 07/27/2023 1157        Please see Results Review for additional labs.     Diagnostic Studies: No relevant studies.    EKG:   No results found for this or any previous visit.    ECHO:  See subjective, if available.      ASSESSMENT/PLAN:           Pre-op  Assessment    I have reviewed the Patient Summary Reports.    I have reviewed the NPO Status.   I have reviewed the Medications.     Review of Systems  Anesthesia Hx:  No problems with previous Anesthesia   History of prior surgery of interest to airway management or planning:  Previous anesthesia: General        Denies Family Hx of Anesthesia complications.         Physical Exam  General: Well nourished, Cooperative, Alert and Oriented    Airway:  Mallampati: I   Mouth Opening: Normal  TM Distance: Normal  Tongue: Normal  Neck ROM: Normal ROM    Dental:  Intact    Chest/Lungs:  Clear to auscultation, Normal Respiratory Rate    Heart:  Rate: Normal  Rhythm: Regular Rhythm  Sounds: Normal        Anesthesia Plan  Type of Anesthesia, risks & benefits discussed:    Anesthesia Type: Gen Natural Airway, MAC, Epidural  Intra-op Monitoring Plan: Standard ASA Monitors  Post Op Pain Control Plan: multimodal analgesia and IV/PO Opioids PRN  Induction:  IV  Airway Plan: , Awake  Informed Consent: Informed consent signed with the Patient and all parties understand the risks and agree with anesthesia plan.  All questions answered. Patient consented to blood products? No  ASA Score: 3  Day of Surgery Review of History & Physical: H&P Update referred to the surgeon/provider.    Ready For Surgery From Anesthesia Perspective.     .

## 2024-08-08 NOTE — L&D DELIVERY NOTE
[facility] - Labor & Delivery  Vaginal Delivery   Operative Note    SUMMARY     This delivery was complicated by a shoulder dystocia    After the head delivered, the right shoulder was noted to be anterior and held behind the pubic symphysis.  Immediately at the identification of dystocia, I asked the nurse to note the time and call assistance to the room including additional residents, nursing staff and peditrics   There was enough room for manuevers and an episiotomy was not required.  Maneuvers Required to relieve the dystocia included: Chantal, suprapubic and delivery of the posterior arm  Cord gases were not sent  Pediatricians/ NICU were not in room at delivery  APGARS were 8 at 1 minute and 9 at 5 minutes  Birthweight 3840 g   was moving both arms without evidence of nerve injury at the time of delivery  There was not evidence of clavicular fracture  The Shoulder dystocia lasted a total of 20 seconds     Nuchal cord: Yes, cord reduced at perineum.  Spontaneous delivery of placenta and IV pitocin given noting  good uterine tone .  2nd degree laceration noted and repaired in normal fashion with 2-0 vicryl .  Patient tolerated delivery well. Sponge needle and lap counted correctly x2.  cc    Indications:  (spontaneous vaginal delivery)  Pregnancy complicated by:   Patient Active Problem List   Diagnosis    History of gestational diabetes    Abnormal genetic test during pregnancy     (spontaneous vaginal delivery)    Shoulder dystocia, delivered    Breast feeding status of mother    Anemia due to blood loss, acute    Second degree perineal laceration during delivery, delivered     Admitting GA: 40w3d    Delivery Information for Girl Sean Trujillo    Birth information:  YOB: 2023   Time of birth: 5:10 PM   Sex: female   Head Delivery Date/Time: 2024  5:10 PM   Delivery type: Vaginal, Spontaneous   Gestational Age: 40w3d        Delivery Providers    Delivering clinician: Dimitris  Lucía ROCHA MD   Provider Role    Amrita Gonzalez RN Murillo, Xzela, RN Hilkirk, Jennifer, RN Sisk, Charles, MD Giraldo, Valentina, MD               Measurements    Weight: 3840g  Length:          Apgars    Living status: Living  Apgar Component Scores:  1 min.:  5 min.:  10 min.:  15 min.:  20 min.:    Skin color:  0  1       Heart rate:  2  2       Reflex irritability:  2  2       Muscle tone:  2  2       Respiratory effort:  2  2       Total:  8  9       Apgars assigned by: APRYL PEGUERO RN         Operative Delivery    Forceps attempted?: No  Vacuum extractor attempted?: No         Shoulder Dystocia    Shoulder dystocia present?: Yes  Anterior shoulder: right  Time recognized: 2024 17:10:00  Gentle attempt at traction, assisted by maternal expulsive forces?: Yes   First maneuver: Dimitri maneuver  First maneuver performed at: 2024 17:10:00  First maneuver performed by: Annabelle MULLINS RN  Second maneuver: suprapubic pressure  Second maneuver performed at: 2024 17:10:00  Second maneuver performed by: Amrita BAUTISTA RN  Third maneuver: delivery of posterior arm  Third maneuver performed at: 2024 17:10:00  Third maneuver performed by: MD Dimitris           Presentation    Presentation: Vertex  Position: Right Occiput Anterior       Interventions/Resuscitation    Method: Bulb Suctioning, Tactile Stimulation       Cord    Vessels: 3 vessels  Complications: Nuchal  Nuchal Intervention: reduced  Nuchal Cord Description: tight nuchal cord  Number of Loops: 1  Delayed Cord Clamping?: Yes  Cord Clamped Date/Time: 2024  5:12 PM  Cord Blood Disposition: Sent with Baby  Gases Sent?: No  Stem Cell Collection (by MD): No       Placenta    Placenta delivery date/time: 2024 1715  Placenta removal: Expressed  Placenta appearance: Intact  Placenta disposition: Family           Labor Events:       labor: No     Labor Onset Date/Time:         Dilation Complete Date/Time: 2024 16:34      Start Pushing Date/Time: 2024 17:00       Start Pushing Date/Time: 2024 17:00     Rupture Date/Time: 24  1429         Rupture type: PROM (Premature Rupture)         Fluid Amount:       Fluid Color: Clear               steroids: None     Antibiotics given for GBS: No     Induction: none     Indications for induction:        Augmentation: oxytocin     Indications for augmentation: Ineffective Contraction Pattern     Labor complications: Shoulder Dystocia     Additional complications:          Cervical ripening:                     Delivery:      Episiotomy: None     Indication for Episiotomy:       Perineal Lacerations: 2nd Repaired:  Yes   Periurethral Laceration:   Repaired:     Labial Laceration:   Repaired:     Sulcus Laceration:   Repaired:     Vaginal Laceration:   Repaired:     Cervical Laceration:   Repaired:     Repair suture:       Repair # of packets: 2     Last Value - EBL - Nursing (mL):       Sum - EBL - Nursing (mL): 0     Last Value - EBL - Anesthesia (mL):      Calculated QBL (mL):       Running total QBL (mL):       Vaginal Sweep Performed: Yes     Surgicount Correct: Yes     Vaginal Packing: No Quantity:       Other providers:       Anesthesia    Method: Epidural       Additional  information:  Forceps: no   Vacuum: no   Breech: no   Observed anomalies    Other (Comments):       Carmel Mitchell  [facility]   OBGYN PGY1           I have reviewed the notes, assessments, and/or procedures performed by Dr. Mitchell, I concur with her/his documentation of Sean Trujillo. I was present and scrubbed for the entire procedure.      Lucía Shanks MD

## 2024-08-08 NOTE — ED PROVIDER NOTES
Encounter Date: 2024       History     Chief Complaint   Patient presents with    Rupture of Membranes     eSan Trujillo is a 34 y.o. [id number] at 40w3d presents complaining of leakage of fluid.   Patient states this morning at around 8 am she began to leak fluid.  Patient reports contractions, denies vaginal bleeding, reports  LOF.   Fetal Movement: normal      This IUP is complicated by Abnormal Genetic Screen         Review of patient's allergies indicates:  No Known Allergies  Past Medical History:   Diagnosis Date    History of gestational diabetes      History reviewed. No pertinent surgical history.  Family History   Problem Relation Name Age of Onset    Breast cancer Neg Hx      Ovarian cancer Neg Hx      Colon cancer Neg Hx       Social History     Tobacco Use    Smoking status: Never    Smokeless tobacco: Never   Substance Use Topics    Alcohol use: Not Currently    Drug use: Never     Review of Systems   Constitutional:  Negative for fever.   Eyes:  Negative for visual disturbance.   Respiratory:  Negative for chest tightness and shortness of breath.    Cardiovascular:  Negative for chest pain, palpitations and leg swelling.   Gastrointestinal:  Negative for abdominal pain, constipation, diarrhea, nausea and vomiting.   Genitourinary:  Positive for pelvic pain. Negative for decreased urine volume, dysuria, hematuria, vaginal bleeding and vaginal discharge.   Neurological:  Negative for syncope and headaches.       Physical Exam   Pulse:  [61-88] 65  SpO2:  [97 %-100 %] 100 %  BP: ()/(57-71) 103/57    Physical Exam    Constitutional: She appears well-developed and well-nourished.   HENT:   Head: Normocephalic and atraumatic.   Eyes: Conjunctivae and EOM are normal. Pupils are equal, round, and reactive to light.   Neck:   Normal range of motion.  Cardiovascular:  Normal rate.           Pulmonary/Chest: No respiratory distress.   Abdominal: Abdomen is soft. There is no abdominal tenderness. There is  no rebound and no guarding.   Genitourinary:    Genitourinary Comments: Normal appearing external genitalia. Vagina pink, moist, well-rugated. Mild pooling of clear/yellow fluid on speculum exam. SVE: 5/50/-2     Musculoskeletal:      Cervical back: Normal range of motion.     Neurological: She is alert and oriented to person, place, and time.   Skin: Skin is warm. Capillary refill takes less than 2 seconds.   Psychiatric: She has a normal mood and affect.         ED Course   Fetal non-stress test    Date/Time: 8/8/2024 12:28 PM    Performed by: Tonie Rucker MD  Authorized by: Lucía Shanks MD    Nonstress Test:     Variability:  6-25 BPM    Decelerations:  None    Accelerations:  15 bpm    Contractions:  Regular  Biophysical Profile:     Nonstress Test Interpretation: reactive      Overall Impression:  Reassuring    Labs Reviewed   CBC W/ AUTO DIFFERENTIAL - Abnormal       Result Value    WBC 12.04      RBC 3.87 (*)     Hemoglobin 12.5      Hematocrit 37.6      MCV 97      MCH 32.3 (*)     MCHC 33.2      RDW 13.3      Platelets 228      MPV 9.5      Immature Granulocytes 1.1 (*)     Gran # (ANC) 9.2 (*)     Immature Grans (Abs) 0.13 (*)     Lymph # 1.7      Mono # 0.9      Eos # 0.1      Baso # 0.02      nRBC 0      Gran % 76.5 (*)     Lymph % 14.2 (*)     Mono % 7.6      Eosinophil % 0.4      Basophil % 0.2      Differential Method Automated     TREPONEMA PALLIDIUM ANTIBODIES IGG, IGM          Imaging Results      Medications   famotidine tablet 40 mg (has no administration in time range)   sodium chloride 0.9% flush 2 mL (has no administration in time range)   lactated ringers bolus 1,000 mL (1,000 mLs Intravenous Bolus from Bag 8/8/24 1203)   lactated ringers bolus 500 mL (has no administration in time range)   lactated ringers infusion ( Intravenous New Bag 8/8/24 1159)   0.9%  NaCl infusion (has no administration in time range)   oxytocin 30 units in 500 mL normal saline infusion (loading dose)  (has no administration in time range)   oxytocin 30 units/500 mL (60 milliunits/mL) in 0.9% NaCl (TITRATING) (has no administration in time range)   ceFAZolin 2 g in D5W 50 mL IVPB (MB+) (has no administration in time range)   azithromycin (ZITHROMAX) 500 mg in D5W 250 mL IVPB (Vial-Mate) (has no administration in time range)   ondansetron disintegrating tablet 8 mg (has no administration in time range)   calcium carbonate 200 mg calcium (500 mg) chewable tablet 500 mg (has no administration in time range)   simethicone chewable tablet 80 mg (has no administration in time range)   LIDOcaine HCL 10 mg/ml (1%) injection 10 mL (has no administration in time range)   oxytocin 30 units in 500 mL normal saline infusion (loading dose) (has no administration in time range)   oxytocin 30 units/500 mL (60 milliunits/mL) in 0.9% NaCl (TITRATING) (has no administration in time range)   oxytocin injection 10 Units (has no administration in time range)   miSOPROStoL tablet 800 mcg (has no administration in time range)   miSOPROStoL tablet 800 mcg (has no administration in time range)   methylergonovine injection 200 mcg (has no administration in time range)   carboprost injection 250 mcg (has no administration in time range)   tranexamic acid in NaCl,iso-os IVPB 1,000 mg (has no administration in time range)   diphenoxylate-atropine 2.5-0.025 mg per tablet 2 tablet (has no administration in time range)   lactated ringers bolus 500 mL (has no administration in time range)   oxytocin 30 units/500 mL (60 milliunits/mL) in 0.9% NaCl (TITRATING) (has no administration in time range)   lactated ringers bolus 1,000 mL (has no administration in time range)   fentanyl 2 mcg/mL with BUPivacaine 0.1% in sodium chloride 0.9% Epidural (has no administration in time range)   sodium citrate-citric acid 500-334 mg/5 ml solution 30 mL (has no administration in time range)   famotidine (PF) injection 20 mg (has no administration in time range)    fentanyl 2mcg/mL with BUPivacaine 0.1% in sdoium chloride 0.9% Epidural 2 mcg/mL- 0.1 % Soln (has no administration in time range)     Medical Decision Making  1. Labor with SROM   - VSS, afebrile   - PE: +pooling on speculum exam, +Nitrazine, +ferning, Cvx 5/50/-2  - US presentation: cephalic     Patient transferred to L&D for augmentation of labor. See H&P for further details.     Carmel Giraldo [facility]   OBGYN PGY1       Amount and/or Complexity of Data Reviewed  Labs: ordered.    Risk  OTC drugs.  Prescription drug management.              Attending Attestation:   Physician Attestation Statement for Resident:  As the supervising MD   Physician Attestation Statement: I have personally seen and examined this patient.   I agree with the above history.  -:   As the supervising MD I agree with the above PE.     As the supervising MD I agree with the above treatment, course, plan, and disposition.   I was personally present during the critical portions of the procedure(s) performed by the resident and was immediately available in the ED to provide services and assistance as needed during the entire procedure.  I have reviewed and agree with the residents interpretation of the following: lab data.  I have reviewed the following: old records at this facility.            Attending ED Notes:   I saw and examined the patient myself along with the resident. I have personally reviewed pertinent prior records, labs, imaging, and procedures. I have reviewed the documentation and agree with the findings and plan as documented.      @ 40w3d presenting with SROM in labor. Cat 1 FHT. Admit for labor management. Anticipate .    Tonie Rucker MD FACOG  OB Hospitalist                               Clinical Impression:  Final diagnoses:  [Z34.90] Encounter for elective induction of labor  [O42.90] Leakage of amniotic fluid  [O80, Z37.9] Normal labor (Primary)  [Z3A.40] 40 weeks gestation of  pregnancy          ED Disposition Condition    Send to L&D Carmel Ibrahim MD  Resident  08/08/24 1221       Tonie Rucker MD  08/08/24 1236

## 2024-08-08 NOTE — PROGRESS NOTES
"LABOR NOTE    S:  MD to bedside for routine cervical exam. Epidural working:  yes  No pt concerns at this time    O: BP (!) 95/55   Pulse 66   Temp 97.7 °F (36.5 °C) (Oral)   Resp 17   Ht 5' 3" (1.6 m)   Wt 70.2 kg (154 lb 12.2 oz)   LMP 11/01/2023   SpO2 99%   Breastfeeding No   BMI 27.42 kg/m²     FHT: 140 bpm, moderate variability, +accels, -decels, Cat 1 (reassuring)  CTX: q 3-4 minutes  SVE: 6/70/-2    TIMELINE:  1430: 6/70/-2, AROM clear.  Original SROM likely fore bag.  Starting oxytocin    ASSESSMENT:  Final Active Diagnoses:    Diagnosis Date Noted POA    PRINCIPAL PROBLEM:  Encounter for elective induction of labor [Z34.90] 04/08/2024 Not Applicable      Problems Resolved During this Admission:     PLAN:    Continue Close Maternal/Fetal Monitoring  Pitocin Augmentation per protocol  Recheck 2 hours or PRN      Edgar Comer MD MS  OB/Gyn  PGY-2      " Delivery Note     I was asked to attend the delivery of a 35 WGA M. Mother was being induced due to obstructive hypertrophic cardiomyopathy. Maternal serologies were reviewed and are as follow: Blood type O negative Ab + (s/p Rhogam), HIV negative, HBsAg negative, Hep C Ab negative, Syphilis Ab negative, Covid negative, GC/CT negative, Rubella immune, and GBS negative. No prophylactic antibiotics. Epidural anesthesia. Betamethasone course (1/19-20). ROM about 6 hrs, clear fluid. No issues with forceps assisted delivery per OB note.     Baby cried shortly after delivery. He was placed in mother's chest. Soon there after he appeared stunned and was brought to the awaiting NICU team. He was placed under a pre-heated radiant warmer. He was initially stimulated, dried, and suctioned (oral/nasal) with bulb & catheter. After 20 seconds of minimal response. We apply FMCPAP 5 Fio2 30%. His HR remained above 100 during his entire resuscitation. He slowly improved and by 3 minutes of life, his respiratory effort was present and color was improving. We went ahead and weaned his Fio2 to 21%. We attempted to wean completely off oxygen but his saturations would return to the mid 80's.     BW 2870 grams (79% per growth chart).   APGARs   At 1 minute: 4 (HR 2, Resp 1, Grimace 1, Tone 0, Color 0)    At 5 minute: 7 (HR 2, Resp 2, Grimace 1, Tone 1, Color 1)  At 10 minute: 8 (HR 2, Resp 2, Grimace 2, Tone 1, Color 1)    Due to his prematurity (35 wga) and respiratory distress requiring oxygen, he was admitted to the nicu for further care and management.     Magdy Hadley MD

## 2024-08-09 PROBLEM — D62 ANEMIA DUE TO BLOOD LOSS, ACUTE: Status: RESOLVED | Noted: 2024-08-09 | Resolved: 2024-08-09

## 2024-08-09 PROBLEM — D62 ANEMIA DUE TO BLOOD LOSS, ACUTE: Status: ACTIVE | Noted: 2024-08-09

## 2024-08-09 LAB
BASOPHILS # BLD AUTO: 0.06 K/UL (ref 0–0.2)
BASOPHILS NFR BLD: 0.3 % (ref 0–1.9)
DIFFERENTIAL METHOD BLD: ABNORMAL
EOSINOPHIL # BLD AUTO: 0.1 K/UL (ref 0–0.5)
EOSINOPHIL NFR BLD: 0.3 % (ref 0–8)
ERYTHROCYTE [DISTWIDTH] IN BLOOD BY AUTOMATED COUNT: 13.1 % (ref 11.5–14.5)
HCT VFR BLD AUTO: 32.4 % (ref 37–48.5)
HGB BLD-MCNC: 10.6 G/DL (ref 12–16)
IMM GRANULOCYTES # BLD AUTO: 0.15 K/UL (ref 0–0.04)
IMM GRANULOCYTES NFR BLD AUTO: 0.8 % (ref 0–0.5)
LYMPHOCYTES # BLD AUTO: 2.2 K/UL (ref 1–4.8)
LYMPHOCYTES NFR BLD: 11.8 % (ref 18–48)
MCH RBC QN AUTO: 32.5 PG (ref 27–31)
MCHC RBC AUTO-ENTMCNC: 32.7 G/DL (ref 32–36)
MCV RBC AUTO: 99 FL (ref 82–98)
MONOCYTES # BLD AUTO: 1.2 K/UL (ref 0.3–1)
MONOCYTES NFR BLD: 6.2 % (ref 4–15)
NEUTROPHILS # BLD AUTO: 15.3 K/UL (ref 1.8–7.7)
NEUTROPHILS NFR BLD: 80.6 % (ref 38–73)
NRBC BLD-RTO: 0 /100 WBC
PLATELET # BLD AUTO: 180 K/UL (ref 150–450)
PMV BLD AUTO: 9.5 FL (ref 9.2–12.9)
RBC # BLD AUTO: 3.26 M/UL (ref 4–5.4)
WBC # BLD AUTO: 18.95 K/UL (ref 3.9–12.7)

## 2024-08-09 PROCEDURE — 51701 INSERT BLADDER CATHETER: CPT

## 2024-08-09 PROCEDURE — 36415 COLL VENOUS BLD VENIPUNCTURE: CPT | Performed by: STUDENT IN AN ORGANIZED HEALTH CARE EDUCATION/TRAINING PROGRAM

## 2024-08-09 PROCEDURE — 85025 COMPLETE CBC W/AUTO DIFF WBC: CPT | Performed by: STUDENT IN AN ORGANIZED HEALTH CARE EDUCATION/TRAINING PROGRAM

## 2024-08-09 PROCEDURE — 51798 US URINE CAPACITY MEASURE: CPT

## 2024-08-09 PROCEDURE — 99233 SBSQ HOSP IP/OBS HIGH 50: CPT | Mod: UC,,, | Performed by: ADVANCED PRACTICE MIDWIFE

## 2024-08-09 PROCEDURE — 25000003 PHARM REV CODE 250

## 2024-08-09 PROCEDURE — 11000001 HC ACUTE MED/SURG PRIVATE ROOM

## 2024-08-09 RX ORDER — OXYCODONE HYDROCHLORIDE 10 MG/1
10 TABLET ORAL EVERY 4 HOURS PRN
Status: DISCONTINUED | OUTPATIENT
Start: 2024-08-09 | End: 2024-08-10 | Stop reason: HOSPADM

## 2024-08-09 RX ORDER — OXYCODONE HYDROCHLORIDE 5 MG/1
5 TABLET ORAL EVERY 4 HOURS PRN
Status: DISCONTINUED | OUTPATIENT
Start: 2024-08-09 | End: 2024-08-10 | Stop reason: HOSPADM

## 2024-08-09 RX ADMIN — ACETAMINOPHEN 650 MG: 325 TABLET, FILM COATED ORAL at 02:08

## 2024-08-09 RX ADMIN — ACETAMINOPHEN 650 MG: 325 TABLET, FILM COATED ORAL at 08:08

## 2024-08-09 RX ADMIN — DOCUSATE SODIUM 200 MG: 100 CAPSULE, LIQUID FILLED ORAL at 08:08

## 2024-08-09 RX ADMIN — IBUPROFEN 600 MG: 600 TABLET, FILM COATED ORAL at 02:08

## 2024-08-09 RX ADMIN — IBUPROFEN 600 MG: 600 TABLET, FILM COATED ORAL at 08:08

## 2024-08-09 RX ADMIN — PRENATAL VIT W/ FE FUMARATE-FA TAB 27-0.8 MG 1 TABLET: 27-0.8 TAB at 08:08

## 2024-08-09 NOTE — PLAN OF CARE
Pt ambulating and voiding without difficulty. Patient safety maintained, side rails up, bed low and locked position.  Pain well controlled with PRN pain medication. Fundus midline, firm, with moderate lochia. VSS. Family at bedside; parents responding to infant cues and bonding appropriately. No further needs at this time.

## 2024-08-09 NOTE — PROGRESS NOTES
[facility])  Obstetrics  Postpartum Progress Note    Patient Name: Sean Trujillo  MRN: [id number]  Admission Date: 2024  Hospital Length of Stay: 1 days  Attending Physician: Lucía Shanks MD  Primary Care Provider: Abhinav Smith MD    Subjective:     Principal Problem: (spontaneous vaginal delivery)    Hospital Course:  2024 PPD #1  34 year old G3 now  s/p . Second degree laceration.  cc. Doing well, ambulating, voiding, and tolerating regular diet.   Lochia: steadily decreasing. Mild PP anemia, asymptomatic. VSS, normotensive and afebrile. Breastfeeding infant w/some difficulty; nipple pain. PO pain meds managing postpartum discomforts. Has not had BM since birth. Normal involution. Depression/anxiety: no hx or s/s per pt statement.  Support at home: partner and family. Rh +, [place]. Desired method of contraception: undecided; understands that progesterone only options are appropriate with breastfeeding. UTD on immunizations except COVID booster. Bonding well w/ baby, who is doing well; will FU w/peds provider. Plan D/C tomorrow.   **Visit conducted through  service.       Interval History: PPD#1  2024 PPD #1  34 year old G3 now  s/p . Second degree laceration.  cc. Doing well, ambulating, voiding, and tolerating regular diet.   Lochia: steadily decreasing. Day 1 H&H not resulted yet; not anemic prior to birth. VSS, normotensive and afebrile. Breastfeeding infant w/some difficulty; nipple pain. PO pain meds managing postpartum discomforts. Has not had BM since birth. Normal involution. Depression/anxiety: no hx or s/s per pt statement.  Support at home: partner and family. Rh +, RI. Desired method of contraception: undecided; understands that progesterone only options are appropriate with breastfeeding. UTD on immunizations except COVID booster. Bonding well w/ baby, who is doing well; will FU w/peds provider. Plan D/C tomorrow.   **Visit  conducted through  service.       Objective:     Vital Signs (Most Recent):  Temp: 98.1 °F (36.7 °C) (24 08)  Pulse: 68 (24 08)  Resp: 17 (24 0030)  BP: (!) 89/52 (patient has no complaint of dizziness/weakness) (24 08)  SpO2: 97 % (24) Vital Signs (24h Range):  Temp:  [97.7 °F (36.5 °C)-99 °F (37.2 °C)] 98.1 °F (36.7 °C)  Pulse:  [] 68  Resp:  [17-18] 17  SpO2:  [97 %-100 %] 97 %  BP: ()/(52-73) 89/52     Weight: 70.2 kg (154 lb 12.2 oz)  Body mass index is 27.42 kg/m².      Intake/Output Summary (Last 24 hours) at 2024 1036  Last data filed at 2024 0630  Gross per 24 hour   Intake 1766.47 ml   Output 4153 ml   Net -2386.53 ml         Significant Labs:  Lab Results   Component Value Date    GROUPTRH O POS 2024    HEPBSAG Non-reactive 2024    STREPBCULT No Group B Streptococcus isolated 2024     Recent Labs   Lab 24  0609   HGB 10.6*   HCT 32.4*       I have personallly reviewed all pertinent lab results from the last 24 hours.    Physical Exam  Gen: A&O x 4, NAD  CV: normal HR  Lungs: normal resp effort  Breasts: bilaterally soft, non-tender, nipples intact without breakdown  Abdomen: soft, non-tender, uterus firm at U - 1 fb  Diastasis Recti:  3 FB  Perineum: well approximated, no edema or ecchymosis   Lochia: minimal rubra  Ext: bilaterally no pedal edema without signs of DVT      Review of Systems  Assessment/Plan:     34 y.o. female  for:    *  (spontaneous vaginal delivery)  2024 PPD #1  34 year old G3 now  s/p . Second degree laceration.  cc. Doing well, ambulating, voiding, and tolerating regular diet.   Lochia: steadily decreasing. Day 1 H&H not resulted yet; not anemic prior to birth. VSS, normotensive and afebrile. Breastfeeding infant w/some difficulty; nipple pain. PO pain meds managing postpartum discomforts. Has not had BM since birth. Normal involution. Depression/anxiety: no  hx or s/s per pt statement.  Support at home: partner and family. Rh +, RI. Desired method of contraception: undecided; understands that progesterone only options are appropriate with breastfeeding. UTD on immunizations except COVID booster. Bonding well w/ baby, who is doing well; will FU w/peds provider. Plan D/C tomorrow.   **Visit conducted through  service.         Disposition: As patient meets milestones, will plan to discharge tomorrow.    Judith Fry CNM  Obstetrics  [facility] ([facility])

## 2024-08-09 NOTE — PROGRESS NOTES
Pt oriented to MBU, care for herself and  as well as safety protocols via virtual , Levi #[id number].

## 2024-08-09 NOTE — HOSPITAL COURSE
"2024 PPD #1  34 year old G3 now  s/p . Second degree laceration.  cc. Doing well, ambulating, voiding, and tolerating regular diet.   Lochia: steadily decreasing. Mild PP anemia, asymptomatic. VSS, normotensive and afebrile. Breastfeeding infant w/some difficulty; nipple pain. PO pain meds managing postpartum discomforts. Has not had BM since birth. Normal involution. Depression/anxiety: no hx or s/s per pt statement.  Support at home: partner and family. Rh +, RI. Desired method of contraception: undecided; understands that progesterone only options are appropriate with breastfeeding. UTD on immunizations except COVID booster. Bonding well w/ baby, who is doing well; will FU w/peds provider. Plan D/C tomorrow.   **Visit conducted through  service.       8/10/24- PP day #3. She is doing very well this morning. Understands and speaks English. Does not desire interpretor. VSS. She is tolerating a regular diet without nausea or vomiting. She is voiding spontaneously. She is ambulating. Asymptomatic mild anemia. Desires to treat with diet. She has passed flatus, and has not a BM. Vaginal bleeding is mild. She denies fever or chills. Abdominal pain is mild and controlled with oral medications. She Is breastfeeding. C/O right breast feeding "hard". Normal exam. No S&S of mastitis or engorgement. S&S reinforced along with frequent feeding, breast massage and home comfort measures. D/C home today. Plans natural family planning at present time. F/U in 6 weeks for postpartum appointment with primary OB or sooner, prn.    "

## 2024-08-09 NOTE — CARE UPDATE
Presented to bedside to evaluate patient after notified by RN that bladder scan revealed 1400 mL in bladder with patient unable to void. She is s/p vaginal delivery around 6pm with epidural. Patient declined in-and-out catheter to RN as she is concerned about pain. On my evaluation, patient pacing around room holding ice pack to suprapubic region appearing significantly uncomfortable. Strongly recommended in-and-out catheterization to patient at this time. Discussed likely immediate relief to lower abdominal pain as well as risks of urinary retention and bladder overdistension. Patient amenable to in-and-out catheterization at this time. Told patient if pain is intolerable, can treat with single dose of stronger opioid to make catheterization more tolerable. Patient voices understanding. Notified RN. Encounter held with [facility]  #[id number].    Mindy Cadet MD  OB/GYN PGY-1

## 2024-08-09 NOTE — ANESTHESIA POSTPROCEDURE EVALUATION
Anesthesia Post Evaluation    Patient: Sean Trujillo    Procedure(s) Performed: * No procedures listed *    Final Anesthesia Type: epidural      Patient location during evaluation: labor & delivery  Patient participation: Yes- Able to Participate  Level of consciousness: awake and alert  Post-procedure vital signs: reviewed and stable  Pain management: adequate  Airway patency: patent  SAMANTHA mitigation strategies: Use of major conduction anesthesia (spinal/epidural) or peripheral nerve block and Multimodal analgesia  PONV status at discharge: No PONV  Anesthetic complications: no      Cardiovascular status: blood pressure returned to baseline and hemodynamically stable  Respiratory status: unassisted and spontaneous ventilation  Hydration status: euvolemic  Follow-up not needed.              Vitals Value Taken Time   BP 89/52 08/09/24 0801   Temp 36.7 °C (98.1 °F) 08/09/24 0801   Pulse 68 08/09/24 0801   Resp 17 08/09/24 0030   SpO2 97 % 08/09/24 0801         No case tracking events are documented in the log.      Pain/Klaus Score: Pain Rating Prior to Med Admin: 4 (8/9/2024  8:12 AM)  Pain Rating Post Med Admin: 0 (8/9/2024  9:00 AM)

## 2024-08-09 NOTE — PLAN OF CARE
"Plan of care:  Mother will nurse baby on cue " 8 or more in 24" and lengthen feedings until content; will achieve a deep, comfortable latch and observe for signs of milk transfer; will apply lanolin to nipples pc;  will monitor baby's 24hr diaper counts; will call for assistance prn.   "

## 2024-08-09 NOTE — ASSESSMENT & PLAN NOTE
2024 PPD #1  34 year old G3 now  s/p . Second degree laceration.  cc. Doing well, ambulating, voiding, and tolerating regular diet.   Lochia: steadily decreasing. Day 1 H&H not resulted yet; not anemic prior to birth. VSS, normotensive and afebrile. Breastfeeding infant w/some difficulty; nipple pain. PO pain meds managing postpartum discomforts. Has not had BM since birth. Normal involution. Depression/anxiety: no hx or s/s per pt statement.  Support at home: partner and family. Rh +, RI. Desired method of contraception: undecided; understands that progesterone only options are appropriate with breastfeeding. UTD on immunizations except COVID booster. Bonding well w/ baby, who is doing well; will FU w/peds provider. Plan D/C tomorrow.   **Visit conducted through  service.

## 2024-08-09 NOTE — SUBJECTIVE & OBJECTIVE
Interval History: PPD#1  2024 PPD #1  34 year old G3 now  s/p . Second degree laceration.  cc. Doing well, ambulating, voiding, and tolerating regular diet.   Lochia: steadily decreasing. Day 1 H&H not resulted yet; not anemic prior to birth. VSS, normotensive and afebrile. Breastfeeding infant w/some difficulty; nipple pain. PO pain meds managing postpartum discomforts. Has not had BM since birth. Normal involution. Depression/anxiety: no hx or s/s per pt statement.  Support at home: partner and family. Rh +, RI. Desired method of contraception: undecided; understands that progesterone only options are appropriate with breastfeeding. UTD on immunizations except COVID booster. Bonding well w/ baby, who is doing well; will FU w/peds provider. Plan D/C tomorrow.   **Visit conducted through  service.       Objective:     Vital Signs (Most Recent):  Temp: 98.1 °F (36.7 °C) (24)  Pulse: 68 (24)  Resp: 17 (24 0030)  BP: (!) 89/52 (patient has no complaint of dizziness/weakness) (24)  SpO2: 97 % (24) Vital Signs (24h Range):  Temp:  [97.7 °F (36.5 °C)-99 °F (37.2 °C)] 98.1 °F (36.7 °C)  Pulse:  [] 68  Resp:  [17-18] 17  SpO2:  [97 %-100 %] 97 %  BP: ()/(52-73) 89/52     Weight: 70.2 kg (154 lb 12.2 oz)  Body mass index is 27.42 kg/m².      Intake/Output Summary (Last 24 hours) at 2024 1036  Last data filed at 2024 0630  Gross per 24 hour   Intake 1766.47 ml   Output 4153 ml   Net -2386.53 ml         Significant Labs:  Lab Results   Component Value Date    GROUPTRH O POS 2024    HEPBSAG Non-reactive 2024    STREPBCULT No Group B Streptococcus isolated 2024     Recent Labs   Lab 24  0609   HGB 10.6*   HCT 32.4*       I have personallly reviewed all pertinent lab results from the last 24 hours.    Physical Exam  Gen: A&O x 4, NAD  CV: normal HR  Lungs: normal resp effort  Breasts: bilaterally soft,  non-tender, nipples intact without breakdown  Abdomen: soft, non-tender, uterus firm at U - 1 fb  Diastasis Recti:  3 FB  Perineum: well approximated, no edema or ecchymosis   Lochia: minimal rubra  Ext: bilaterally no pedal edema without signs of DVT      Review of Systems

## 2024-08-09 NOTE — LACTATION NOTE
08/09/24 1415   Maternal Assessment   Breast Shape Left:;round   Breast Density Left:;soft   Areola Left:;elastic   Nipples Left:;everted;graspable   Left Nipple Symptoms painful;redness;cracked  (crack developing at base of nipple)   Maternal Infant Feeding   Maternal Emotional State anxious;assist needed   Infant Positioning cross-cradle   Signs of Milk Transfer infant jaw motion present   Pain with Feeding yes   Pain Location nipples, bilateral   Pain Description soreness   Comfort Measures Before/During Feeding infant position adjusted;maternal position adjusted   Comfort Measures Following Feeding   (reviewed use of lanolin pc)   Latch Assistance yes  (moderate positioning and latch assistance provided to hold baby close to body, chest-to-chest and to get a deep latch c a wide gape)     Called to patient's room, baby wrapped in fuzzy blanket held in patient's arms.  C/o sore nipples and baby acting hungry after breastfeeding.  Assisted patient to unwrap baby from blanket, observed patient position baby c body facing upward toward ceiling and tickle baby's lips c her nipple, attempted to latch baby on, achieved a shallow painful latch.  Baby removed from breast.  Moderate positioning and latch assistance provided, L breast, cross cradle position, deep latch achieved, baby actively sucking.  Basic education provided c an emphasis on supply-demand principle and importance of a deep latch for milk transfer.   Encouraged to nurse baby first at each feeding prior to offering formula.  Encouraged to call for assistance at the next feeding.

## 2024-08-10 VITALS
TEMPERATURE: 99 F | HEART RATE: 68 BPM | BODY MASS INDEX: 27.42 KG/M2 | DIASTOLIC BLOOD PRESSURE: 54 MMHG | SYSTOLIC BLOOD PRESSURE: 96 MMHG | OXYGEN SATURATION: 98 % | RESPIRATION RATE: 18 BRPM | HEIGHT: 63 IN | WEIGHT: 154.75 LBS

## 2024-08-10 PROCEDURE — 25000003 PHARM REV CODE 250

## 2024-08-10 RX ORDER — DOCUSATE SODIUM 100 MG/1
100 CAPSULE, LIQUID FILLED ORAL 2 TIMES DAILY
Qty: 60 CAPSULE | Refills: 0 | Status: SHIPPED | OUTPATIENT
Start: 2024-08-10

## 2024-08-10 RX ORDER — IBUPROFEN 600 MG/1
600 TABLET ORAL EVERY 6 HOURS PRN
Qty: 60 TABLET | Refills: 0 | Status: SHIPPED | OUTPATIENT
Start: 2024-08-10

## 2024-08-10 RX ADMIN — DOCUSATE SODIUM 200 MG: 100 CAPSULE, LIQUID FILLED ORAL at 08:08

## 2024-08-10 RX ADMIN — IBUPROFEN 600 MG: 600 TABLET, FILM COATED ORAL at 08:08

## 2024-08-10 RX ADMIN — ACETAMINOPHEN 650 MG: 325 TABLET, FILM COATED ORAL at 02:08

## 2024-08-10 RX ADMIN — IBUPROFEN 600 MG: 600 TABLET, FILM COATED ORAL at 02:08

## 2024-08-10 RX ADMIN — ACETAMINOPHEN 650 MG: 325 TABLET, FILM COATED ORAL at 08:08

## 2024-08-10 RX ADMIN — PRENATAL VIT W/ FE FUMARATE-FA TAB 27-0.8 MG 1 TABLET: 27-0.8 TAB at 08:08

## 2024-08-10 NOTE — PLAN OF CARE
Pt to nurse/ pump 8x/day, supplement as needed to ensure baby content, voiding and stooling per day of life.

## 2024-08-10 NOTE — DISCHARGE SUMMARY
"[facility] ([place])  Obstetrics  Discharge Summary      Patient Name: Sean Trujillo  MRN: [id number]  Admission Date: 2024  Hospital Length of Stay: 2 days  Discharge Date and Time:  08/10/2024 10:02 AM  Attending Physician: Lucía Shanks MD   Discharging Provider: Urvashi Cadet CNM   Primary Care Provider: Abhinav Smith MD    HPI: PP day #2    * No surgery found *     Hospital Course:   2024 PPD #1  34 year old G3 now  s/p . Second degree laceration.  cc. Doing well, ambulating, voiding, and tolerating regular diet.   Lochia: steadily decreasing. Mild PP anemia, asymptomatic. VSS, normotensive and afebrile. Breastfeeding infant w/some difficulty; nipple pain. PO pain meds managing postpartum discomforts. Has not had BM since birth. Normal involution. Depression/anxiety: no hx or s/s per pt statement.  Support at home: partner and family. Rh +, RI. Desired method of contraception: undecided; understands that progesterone only options are appropriate with breastfeeding. UTD on immunizations except COVID booster. Bonding well w/ baby, who is doing well; will FU w/peds provider. Plan D/C tomorrow.   **Visit conducted through  service.       8/10/24- PP day #3. She is doing very well this morning. Understands and speaks English. Does not desire interpretor. VSS. She is tolerating a regular diet without nausea or vomiting. She is voiding spontaneously. She is ambulating. Asymptomatic mild anemia. Desires to treat with diet. She has passed flatus, and has not a BM. Vaginal bleeding is mild. She denies fever or chills. Abdominal pain is mild and controlled with oral medications. She Is breastfeeding. C/O right breast feeding "hard". Normal exam. No S&S of mastitis or engorgement. S&S reinforced along with frequent feeding, breast massage and home comfort measures. D/C home today. Plans natural family planning at present time. F/U in 6 weeks for postpartum appointment " with primary OB or sooner, prn.    Doing well, ambulating, voiding, and tolerating regular diet  Denies dizziness or light headed sensation. Denies SOB or difficulty breathing.   Denies headaches, visual disturbances or upper GI pain, nausea, or vomiting.  Reports passing flatus.   Lochia: steadily decreasing  Pain: well controlled with oral pain meds.  Breasts/nipples: Denies S&S of mastitis or engorgement.  Depression/anxiety: denies   Support at home: yes  Contraception: considering natural family planning; understands that progesterone only options are appropriate with breastfeeding  :  is doing well, will f/u with pediatrician.     Gen: A&O x 4, NAD  Abdomen: soft, non-tender, uterus firm at U - 2 fb  Breasts: lactating, no S&S of mastitis or engorgement.  Perineum: approximated, no edema   Lochia: minimal rubra  Ext: bilaterally no pedal edema, without signs of DVT       Final Active Diagnoses:    Diagnosis Date Noted POA    PRINCIPAL PROBLEM:   (spontaneous vaginal delivery) [O80] 2024 Not Applicable    Breast feeding status of mother [Z39.1] 2024 Not Applicable    Obstetrical laceration, second degree [O70.1] 2024 No    Shoulder dystocia, delivered [O66.0] 2024 No      Problems Resolved During this Admission:    Diagnosis Date Noted Date Resolved POA    Anemia due to blood loss, acute [D62] 2024 No    Encounter for elective induction of labor [Z34.90] 2024 Not Applicable        Significant Diagnostic Studies: Labs: CBC   Recent Labs   Lab 24  1118 24  0609   WBC 12.04 18.95*   HGB 12.5 10.6*   HCT 37.6 32.4*    180         Feeding Method: breast    Immunizations       Date Immunization Status Dose Route/Site Given by    24 MMR Incomplete 0.5 mL Subcutaneous/     24 Tdap Incomplete 0.5 mL Intramuscular/             Delivery:    Episiotomy: None   Lacerations: 2nd   Repair suture:     Repair # of  "packets: 2   Blood loss (ml):       Birth information:  YOB: 2023   Time of birth: 5:10 PM   Sex: female   Delivery type: Vaginal, Spontaneous   Gestational Age: 40w3d     Measurements    Weight: 3840 g  Weight (lbs): 8 lb 7.5 oz  Length: 50.8 cm  Length (in): 20"  Head circumference: 35.3 cm  Chest circumference: 35.9 cm         Delivery Clinician: Delivery Providers    Delivering clinician: Lucía Shanks MD   Provider Role    Amrita Gonzalez RN Murillo, Xzela, RN Hilkirk, Jennifer, RN Sisk, Charles, MD Giraldo, Valentina, MD              Additional  information:  Forceps:    Vacuum:    Breech:    Observed anomalies      Living?:     Apgars    Living status: Living  Apgar Component Scores:  1 min.:  5 min.:  10 min.:  15 min.:  20 min.:    Skin color:  0  1       Heart rate:  2  2       Reflex irritability:  2  2       Muscle tone:  2  2       Respiratory effort:  2  2       Total:  8  9       Apgars assigned by: APRYL PEGUERO RN         Placenta: Delivered:       appearance  Pending Diagnostic Studies:       None            Discharged Condition: stable    Disposition: Home or Self Care    Follow Up:   Follow-up Information       Lucía Shanks MD Follow up in 6 week(s).    Specialty: Obstetrics and Gynecology  Why: Post partum check  Contact information:  [address] [place] [address] [id number] [phone]                           Patient Instructions:      BREAST PUMP FOR HOME USE     Order Specific Question Answer Comments   Type of pump: Electric    Weight: 70.2 kg (154 lb 12.2 oz)    Length of need (1-99 months): 99      Diet Adult Regular     Diet Adult Regular     Lifting restrictions   Order Comments: No lifting anything larger than baby for 6 weeks     No driving until:   Order Comments: No driving while taking narcotics     Pelvic Rest   Order Comments: Until seen in clinic     Notify your health care provider if you experience any of the following:  " temperature >100.4     Notify your health care provider if you experience any of the following:  persistent nausea and vomiting or diarrhea     Notify your health care provider if you experience any of the following:  severe uncontrolled pain     Notify your health care provider if you experience any of the following:  redness, tenderness, or signs of infection (pain, swelling, redness, odor or green/yellow discharge around incision site)     Notify your health care provider if you experience any of the following:  difficulty breathing or increased cough     Notify your health care provider if you experience any of the following:  severe persistent headache     Notify your health care provider if you experience any of the following:  persistent dizziness, light-headedness, or visual disturbances     Lifting restrictions     Pelvic Rest     Notify your health care provider if you experience any of the following:  temperature >100.4     Notify your health care provider if you experience any of the following:  persistent nausea and vomiting or diarrhea     Notify your health care provider if you experience any of the following:  severe uncontrolled pain     Notify your health care provider if you experience any of the following:  redness, tenderness, or signs of infection (pain, swelling, redness, odor or green/yellow discharge around incision site)     Notify your health care provider if you experience any of the following:  difficulty breathing or increased cough     Notify your health care provider if you experience any of the following:  severe persistent headache     Notify your health care provider if you experience any of the following:  worsening rash     Notify your health care provider if you experience any of the following:  persistent dizziness, light-headedness, or visual disturbances     Notify your health care provider if you experience any of the following:  increased confusion or weakness     Activity  as tolerated     Medications:  Current Discharge Medication List        START taking these medications    Details   docusate sodium (COLACE) 100 MG capsule Take 1 capsule (100 mg total) by mouth 2 (two) times daily.  Qty: 60 capsule, Refills: 0      ibuprofen (ADVIL,MOTRIN) 600 MG tablet Take 1 tablet (600 mg total) by mouth every 6 (six) hours as needed for Pain.  Qty: 60 tablet, Refills: 0           STOP taking these medications       famotidine (PEPCID) 40 MG tablet Comments:   Reason for Stopping:         hydrocortisone 2.5 % cream Comments:   Reason for Stopping:         anson lewis ointment Comments:   Reason for Stopping:         ondansetron (ZOFRAN-ODT) 4 MG TbDL Comments:   Reason for Stopping:         PNV,calcium 72-iron-folic acid (PRENATAL VITAMIN PLUS LOW IRON) 27 mg iron- 1 mg Tab Comments:   Reason for Stopping:             Follow Up/Patient Instructions:   1. Reviewed postpartum recommendations and precautions ~ encouraged to call for fever, severe or increased pain in head, chest, abdomen, perineum or legs; heavy bleeding, foul smelling lochia, signs of depression, or any other concern. Reviewed postpartum precautions r/t high blood pressure ~ encouraged to call for HA, vision changes, epigastric discomfort, or abnormal swelling.  2. Rx provided: Motrin and Colace.  3. Contraception: considering natural family planning; will f/u at postpartum visit. Encouraged abstinence and pelvic rest for healing until after postpartum check-up.   4. Encouraged to continue PNV while breastfeeding or at least for 6 weeks postpartum. Iron rich diet and good po hydration stressed.  5. Car seat law will be reviewed with patient at discharge per protocol  6. RTO in 6 weeks or sooner prn.  7. Discharge home today with written and verbal postpartum instructions and precautions.       Urvashi Cadet CNM  Obstetrics  [facility] ([facility])

## 2024-08-10 NOTE — DISCHARGE INSTRUCTIONS
Community Resources for Breastfeeding Mothers:   Hospital Breastfeeding Centers/ Lactation Consultants:   [facility]........................................................................................[phone]   [facility]....................................................................................[phone]   [facility]..........................[phone]   [facility].................................................[phone]   [facility].......................................................[phone]   [facility].................................[phone]   [facility].......................................[phone]   [facility]..................................................[phone]   [facility].....................................................................[phone]      [facility] (Poison Control)...........................................................[phone]    PoisonHelp.org   Free medical advice 24/7 through the Poison Help Line and the online tool      Online Resources:   International Breastfeeding [facility]ings ...............................................................................ibconline.ca   Dr Kerwin Harman online resource provides videos, articles, and information sheets.     Coeffective...............................................................................................................coeffective.com   Download the free mobile farrah to help get off to a great start with breastfeeding.   Droplet.....................................................................................................................24PageBooks.com   Global Health  Media...........................................................................................Jelly HQ.org   Videos that teach and empower mothers and caregivers   Infant [facility].............................................................................[phone]      Pathway Lending   Provides up to date information for medication use by moms during pregnancy and while breastfeeding.   Ruth Edwards....................................................................................................................kellymom.com   Provides online information on breastfeeding and parenting      La Leche League........................................................................................ lllalmsla.org   /   llli.org   Mother-to-mother support groups with education, information support, and encouragement    Work and Pump........................................................................................... Dblur Technologies.com   Information about breastfeeding for working moms     [place] Resources:   [place] Breastfeeding [facility]............................... [phone]    [facility]   Find local breastfeeding support   [place] Breastfeeding Support............................................................ LaBreastfeedingSport.org   Zip code search of breastfeeding resources in your area   Partners for Healthy Babies............................................................[phone]   4830228jjps.org   Connects [place] moms and their families to health and pregnancy resources.  24/7   WIC (Women, Infant, Children)......................................................... [phone]   ldh.la.gov/WIC   Download the English TV farrah, get code from WIC office    [facility] Resources:     Baby Cafe............................................................................................................. babycafeusa.org   Free, drop in, informal  breastfeeding support groups offering professional lactation care and intervention.    [facility] [place] [facility]....................................... birthmarkbehaview.com   Infant feeding drop in clinics, Lactation services, support groups, education programs   [facility]...............................................[phone]   Opsmatic.com/groups/[facility]   Free breastfeeding support group for families of color   Mothers Milk Bank [facility]....................................................[phone]                                                             Windar PhotonicssStyleTread.Comparisim/services/[facility]s-milk-bank[facility]   MELLISA Nesting..................................................................................[phone] nolanesting.com   In person and virtual support for families through pregnancy, birth, and early parenthood.       [facility] [place]- Dr. Teresa Coffey.......................[phone]   [address] [place] [address]                                  [email or web address]   [email or web address]   [facility] Lactation [facility], [facility] (Kira Johansen RN, IBCLC) ............................[phone] [email or web address]    Healthy Start [place].....................................[phone] ([place])  [phone] ([place])   [facility].gov/health-department/healthy-start   Serves women of childbearing age and addresses issues for pregnant women and their children from birth  to age two.          La Leche League- Jagdeep Caroleen............................. Razer.GlassUp/ Opsmatic.GlassUp/ Lamppostezequiel   In person and virtual mother to mother support groups with education, information support and   encouragement to women who want to breastfeed      [place] Resources:   Breastfeeding Resources- [facility]  Health.....msdh.ms.gov (under womens services)   Find resources and info about planning for breastfeeding, its benefits, and help with breastfeeding  s uccessfully.    [facility] For Pregnancy Choices- [place]....................................... PayAllies   [phone]   [address] [place], [place]. Call or text 24/7   [facility].......................................................[email or web address]   [facility] Lactation Consultants sere [facility], including [facility],   [facility], and surrounding areas.    [place] Breastfeeding Coalition...............................................................................msbfc.org   Promotes and supports breastfeeding with families, health providers, and communities.   [facility] breastfeeding Coalition.....................................................................smbfc.org   Find breastfeeding resources and support groups in your area.    WIC Nutrition Program- [facility] of Health.................................... ms.gov

## 2024-08-10 NOTE — LACTATION NOTE
08/10/24 0940   Maternal Assessment   Breast Shape Bilateral:;round  (small)   Breast Density Bilateral:;soft;filling  (breast changes)   Areola Bilateral:;elastic   Nipples Bilateral:;everted   Left Nipple Symptoms cracked;painful;tender   Right Nipple Symptoms tender   Maternal Infant Feeding   Maternal Emotional State assist needed;tense   Infant Positioning cross-cradle   Signs of Milk Transfer audible swallow;infant jaw motion present   Pain with Feeding yes   Pain Location nipples, bilateral   Pain Description sharp;soreness   Comfort Measures Before/During Feeding infant position adjusted;latch adjusted;suction broken using finger;maternal position adjusted   Comfort Measures Following Feeding expressed milk applied;air-drying encouraged  (lanolin)   Nipple Shape After Feeding, Left round   Nipple Shape After Feeding, Right round   Latch Assistance yes  (moderate assistance with infant neck support and hand placements for mom)   Equipment Type   Breast Pump Type double electric, personal  (Medline pump at home, to use; gave [place] for hospital and set of 21mm flanges)   Breast Pump Flange Type hard   Breast Pump Flange Size 21 mm   Breast Pumping   Breast Pumping Interventions post-feed pumping encouraged;frequent pumping encouraged;early pumping promoted   Breast Pumping hand expression utilized   Community Referrals   Community Referrals support group;pediatric care provider;outpatient lactation program     Upon entering room, patient declines  and speaks/ understands English.   Discharge lactation education reviewed with mother baby postpartum guide regarding breastfeeding. Patient reports significant nipple pain. Crack to top base of left nipple. Patient demonstrated how she has been supporting her breast and noted her fingers are too close to nipple, potentially blocking baby from achieving deep latch beyond nipple tip. Baby has been latching and supplementing with formula via yellow  nipple, taking 20-30mL.     Reviewed expected volumes on breast milk on day 2 of life and baby may be frustrated as she expects faster flow due to bottles. Patient changed stool diaper and accepted assistance with position and latch on both breasts. Baby chompy at first on gloved finger, tongue appears short when crying, baby able to coordinate suck on LC gloved finger. Assisted with position and latch, educated mom to support baby's neck versus her head to achieve deeper latch by bringing baby's chin into breast first. Mom to widen fingers on breasts to allow baby to latch deeply onto breast versus nipple. Mom able to latch baby deeply on right breast with some assistance with hand placements. Baby nursed well with breast compression and infant stimulation, swallows noted.     Left breast attempted. Baby fussier during latch attempt and nipple cracked/ painful,  LC had to have baby suck gloved finger in order to calm baby then attempt latch. Baby took 3-4 latch attempts before latching deeply, mom experiencing pain, after several minutes mother had to remove baby from breast due to pain.     LC advised mom to use hand expressed EBM to nipple and then lanolin. She has a medline pump for home use, explained importance of protecting milk supply on left breast if unable to latch baby comfortably. gogamingo manual pump provided for pt to use today while still inpatient, then will use her Medline pump once home. Flange fit verified to be 21mm flange, set given as appears compatible with Medline pump at home.     Plan to nurse each breast, pump and supplement until baby content.   Marco provided all cleaning supplies to pt to care for [place] pump- wash basin, soap, sterilizing bag and bottles. Educated to wash parts each use and sterilize daily.   LC also provided slower purple nipple to use for supplementation as baby will need slower flow in order to go between breast and bottle.      RN updated on consult.

## 2024-08-10 NOTE — PLAN OF CARE
Pt ambulating and voiding without difficulty. Patient safety maintained, side rails up, bed low and locked position.  Pain well controlled with PRN pain medication. Fundus midline, firm, with moderate lochia. VSS. Significant other at bedside; parents responding to infant cues and bonding appropriately. Reviewed postpartum care guide and discharge information, no questions at this time. Ready and awaiting discharge.

## 2024-08-30 ENCOUNTER — TELEPHONE (OUTPATIENT)
Dept: OBSTETRICS AND GYNECOLOGY | Facility: CLINIC | Age: 34
End: 2024-08-30
Payer: MEDICAID

## 2024-08-30 ENCOUNTER — CLINICAL SUPPORT (OUTPATIENT)
Facility: CLINIC | Age: 34
End: 2024-08-30
Payer: MEDICAID

## 2024-08-30 VITALS
DIASTOLIC BLOOD PRESSURE: 65 MMHG | SYSTOLIC BLOOD PRESSURE: 99 MMHG | OXYGEN SATURATION: 98 % | RESPIRATION RATE: 17 BRPM | TEMPERATURE: 98 F | HEART RATE: 66 BPM

## 2024-08-30 NOTE — PROGRESS NOTES
"Post Visit Nursing Note  Maternal Note  In-Home Visit    Family Connects Consent: Yes     Home visit completed 2024. This is the initial visit to the home by a Family Connects nurse.     Return Visit Needed: Yes   (If yes) Return visit date: October 10, 2024    Maternal History:    Sean Trujillo is a 34 y.o. female , who delivered at 40w3d GA via  .    Sean Trujillo experienced the following pregnancy and delivery complications during this most recent pregnancy: Abnormal Genetic Screen (cfDNA +45X Mosaic).    Vaccine History:   Immunization History   Administered Date(s) Administered    COVID-19, MRNA, LN-S, PF (MODERNA FULL 0.5 ML DOSE) 2021, 2021    MMR 2015    PPD Test 09/10/2014    Tdap 2024    Varicella 2015       Maternal Assessment:    Vital Signs During Home Visit:   BP 99/65 (BP Location: Right arm, Patient Position: Sitting, BP Method: Large (Automatic))   Pulse 66   Temp 97.9 °F (36.6 °C) (Temporal)   Resp 17   LMP 2023   SpO2 98%   Breastfeeding Yes   [place]  Depression Scale (EPDS) During Home Visit: 14  Feeding: breast and bottle  Relationship Risk: 0  Substance Use Risk: 0  Contraception: no method  Plan for Additional Children: Undecided    Subjective:  The patient reports concerns regarding her perineal tear, describing it as painful and noting a fishy odor. She has been instructed to manage pain through the following regimen provided by Dr. Colvin: alternating ibuprofen every 6 hours and Tylenol 1000mg every 8 hours. The patient was advised to seek care in the [facility] if pain becomes severe.  She completed the [place]  Depression Scale (EPDS) with a score of 14. The patient denies needing further evaluation for depression, stating, "I think I'm fine. I usually get emotional easily even before pregnancy, so I think I'm fine, but I recommended consulting a specialist for further evaluation.  Objective:    Vital signs: Stable  No " heavy vaginal bleeding noted  Patient appears well-nourished and is caring for her infant effectively  Strong support system in place  Assessment:  Patient is recovering well postpartum with no major concerns aside from pain related to the perineal tear and psychological well-being indicated by EPDS score.   Plan:    Continue pain management as prescribed.  Recommend follow-up with OB/GYN if pain worsens or if any signs of infection develop (increased pain, fever, or worsening odor).  Encourage patient to reconsider specialist referral for depression evaluation.  Advise patient about the importance of attending the W.I.C appointment, especially given it is scheduled for November.  Schedule next home visit for .    Home Environment:    Home is safe, equipped with smoke and CO2 detectors to ensure their well-being.      Referrals:    W.I.C  Dr. Colvin    Education Materials Provided:    POST BIRTH Warning Signs  Birth Control Options  Smart Snack Guide  Healthy Sleep  Infant Skin Care  Infant Warning Signs  Recommended Immunizations  MELLISA PS- Early Learning & Childcare Application  Infant Bonding  Tummy Time  Infant Crying & Shaken Baby Syndrome  Home Safety (lead, gun storage, smoke/co detectors, medicine safety)  Safe Sleep   Mental Health Disorders   Smoking

## 2024-09-05 ENCOUNTER — POSTPARTUM VISIT (OUTPATIENT)
Dept: OBSTETRICS AND GYNECOLOGY | Facility: CLINIC | Age: 34
End: 2024-09-05
Payer: MEDICAID

## 2024-09-05 VITALS
BODY MASS INDEX: 23.86 KG/M2 | HEIGHT: 63 IN | WEIGHT: 134.69 LBS | SYSTOLIC BLOOD PRESSURE: 98 MMHG | DIASTOLIC BLOOD PRESSURE: 56 MMHG

## 2024-09-05 DIAGNOSIS — N89.8 GRANULATION TISSUE OF VAGINA: Primary | ICD-10-CM

## 2024-09-05 PROCEDURE — 99213 OFFICE O/P EST LOW 20 MIN: CPT | Mod: S$PBB,TH,, | Performed by: NURSE PRACTITIONER

## 2024-09-05 PROCEDURE — 1111F DSCHRG MED/CURRENT MED MERGE: CPT | Mod: CPTII,,, | Performed by: NURSE PRACTITIONER

## 2024-09-05 PROCEDURE — 99213 OFFICE O/P EST LOW 20 MIN: CPT | Mod: PBBFAC,TH,PN | Performed by: NURSE PRACTITIONER

## 2024-09-05 PROCEDURE — 99999 PR PBB SHADOW E&M-EST. PATIENT-LVL III: CPT | Mod: PBBFAC,,, | Performed by: NURSE PRACTITIONER

## 2024-09-05 NOTE — PROGRESS NOTES
CC: vaginal pain to tear     HPI: Pt is a 34 y.o.  female who presents for c/o increased pain to the site of her vaginal tear. She reports still having scant bleeding. She is s/p vaginal delivery of 24. Baby was 8 lbs 7 oz. She denies any fever. No constipation,. She has not had intercourse since delivery.     ROS:  GENERAL: Feeling well overall. Denies fever or chills.   SKIN: Denies rash or lesions.   HEAD: Denies head injury or headache.   NODES: Denies enlarged lymph nodes.   CHEST: Denies chest pain or shortness of breath.   CARDIOVASCULAR: Denies palpitations or left sided chest pain.   ABDOMEN: No abdominal pain, constipation, diarrhea, nausea, vomiting or rectal bleeding.   URINARY: No dysuria, hematuria, or burning on urination.  REPRODUCTIVE: See HPI.   BREASTS: Denies pain, lumps, or nipple discharge.   HEMATOLOGIC: No easy bruisability or excessive bleeding.   MUSCULOSKELETAL: Denies joint pain or swelling.   NEUROLOGIC: Denies syncope or weakness.   PSYCHIATRIC: Denies depression, anxiety or mood swings.    PE:   APPEARANCE: Well nourished, well developed,  female in no acute distress.  VULVA: No lesions. Normal external female genitalia.   URETHRAL MEATUS: Normal size and location, no lesions, no prolapse.  URETHRA: No masses, tenderness, or prolapse.  VAGINA: Moist. No lesions or lacerations noted. No abnormal discharge present. No odor present.  + granulation tissue notes to site of 2 nd tear  sutures still intact - healing well -silver nitrate applied   CERVIX: No lesions or discharge. No cervical motion tenderness.   UTERUS: Normal size, regular shape, mobile, non-tender.  ADNEXA: No tenderness. No fullness or masses palpated in the adnexal regions.   ANUS PERINEUM: Normal.      Diagnosis:  1. Granulation tissue of vagina    2.  (spontaneous vaginal delivery)        Plan:   Silver nitrate applied to vaginal granulation tissue  Reviewed PP precautions- avoid bathing, exercise and  intercourse until after PP visit       Follow-up for PP visit or as needed no resolution of symptoms.    Anna Marrero, AURORA-C

## 2024-09-19 ENCOUNTER — POSTPARTUM VISIT (OUTPATIENT)
Dept: OBSTETRICS AND GYNECOLOGY | Facility: CLINIC | Age: 34
End: 2024-09-19
Payer: MEDICAID

## 2024-09-19 VITALS
WEIGHT: 136.25 LBS | HEIGHT: 62 IN | SYSTOLIC BLOOD PRESSURE: 116 MMHG | DIASTOLIC BLOOD PRESSURE: 74 MMHG | BODY MASS INDEX: 25.07 KG/M2

## 2024-09-19 PROCEDURE — 99999 PR PBB SHADOW E&M-EST. PATIENT-LVL III: CPT | Mod: PBBFAC,,, | Performed by: STUDENT IN AN ORGANIZED HEALTH CARE EDUCATION/TRAINING PROGRAM

## 2024-09-19 PROCEDURE — 99213 OFFICE O/P EST LOW 20 MIN: CPT | Mod: PBBFAC,PN | Performed by: STUDENT IN AN ORGANIZED HEALTH CARE EDUCATION/TRAINING PROGRAM

## 2024-09-21 NOTE — PROGRESS NOTES
Sean Trujillo is a 34 y.o. female  presents for a postpartum visit.  She is status post  6 weeks ago.  Delivery complicated by shoulder dystocia. Her hospitalization was complicated. She was seen 3 weeks ago for pain at 2nd degree repair. Silver nitate was applied, pain improved now. Bleeding is minimal. She is breastfeeding, no issues.  She desires no method for contraception.  She denies postpartum depression.    Her last pap was 2023 and was NILM, HPV neg    Past Medical History:   Diagnosis Date    History of gestational diabetes      History reviewed. No pertinent surgical history.  Review of patient's allergies indicates:  No Known Allergies    Current Outpatient Medications:     docusate sodium (COLACE) 100 MG capsule, Take 1 capsule (100 mg total) by mouth 2 (two) times daily. (Patient not taking: Reported on 2024), Disp: 60 capsule, Rfl: 0    ibuprofen (ADVIL,MOTRIN) 600 MG tablet, Take 1 tablet (600 mg total) by mouth every 6 (six) hours as needed for Pain. (Patient not taking: Reported on 2024), Disp: 60 tablet, Rfl: 0      Vitals:    24 1440   BP: 116/74       GENERAL: healthy, alert, no distress  ABDOMEN: Normal, benign. and no masses, hepatosplenomegaly, no hernias  EXTERNAL GENITALIA POSTPARTUM: normal, well-healed, without lesions or masses   VAGINA POSTPARTUM: normal, well-healed, physiologic discharge, without lesions   CERVIX POSTPARTUM: normal, well-healed, without lesions   UTERUS POSTPARTUM: normal size, well involuted, firm, non-tender, ADNEXA POSTPARTUM: no masses palpable and nontender    Assessment:  Normal postpartum exam    Plan:  Routine follow up.      Lucía Shanks MD

## 2024-09-23 ENCOUNTER — CLINICAL SUPPORT (OUTPATIENT)
Facility: CLINIC | Age: 34
End: 2024-09-23
Payer: MEDICAID

## 2024-09-23 NOTE — PROGRESS NOTES
Post Visit Nursing Note  Maternal Note  In-Home Visit    Family Connects Consent: yes     Home visit completed 2024. This is the second visit to the home by a Family Connects nurse.     Return Visit Needed: Yes   (If yes) Return visit date: 2024      Vaccine History:   Immunization History   Administered Date(s) Administered    COVID-19, MRNA, LN-S, PF (MODERNA FULL 0.5 ML DOSE) 2021, 2021    MMR 2015    PPD Test 09/10/2014    Tdap 2024    Varicella 2015       Maternal Assessment:  Patient: Ms. Trujillo  Age: 34 years  /Para:   Date of Visit: 2024  Date of Delivery: 2024  Delivery Method: Vaginal delivery with shoulder dystocia and 2nd degree vaginal laceration    Subjective: Ms. Trujillo reports that she is feeling well postpartum. She states that her vaginal pain has resolved since her last visit, and she is no longer experiencing any discomfort in the perineal area. She notes that her menstrual cycle has resumed, indicating a return to normal hormonal function. The patient has stopped breastfeeding due to low supply, which she finds has helped with her overall comfort.   Ms. Trjuillo expressed concerns about the availability and cost of formula, indicating that she is struggling to afford it. She is also facing difficulties in securing an earlier appointment for W.I.C. (Women, Infants, and Children) services to receive assistance with formula and nutrition. She expresses feeling anxious about managing these challenges.       Objective:  Perineal area appears clean and healing well; no signs of infection noted.  No tenderness upon palpation of the perineum noted by patient.  Emotional state appears stabilized, patient expresses feelings of satisfaction with her recovery.    Assessment: Patient is recovering well postpartum with resolution of previous complaints of perineal pain and vaginal odor. Healing from the vaginal laceration is appropriate, and  the patients mood has improved, which is a positive indicator for her overall postpartum adjustment. Additionally, concerns about formula availability and W.I.C. access are noted and warrant further support.     Plan:  Continue monitoring for any signs of infection or changes in vaginal discharge.  Reinforce education on pelvic floor exercises to aid in recovery.  Encourage patient to reach out for any concerns regarding her menstrual cycle or mood changes.  Discuss available community resources or programs that may assist with formula and nutritional needs.  Assist Ms. Trujillo with contacting W.I.C. for potential expedited services.  Schedule a follow-up visit in 4-6 weeks or sooner if needed.      Referrals:    Healthy Start  Free Formula Exchange   [facility]

## 2024-11-04 ENCOUNTER — CLINICAL SUPPORT (OUTPATIENT)
Facility: CLINIC | Age: 34
End: 2024-11-04
Payer: MEDICAID

## 2024-11-04 NOTE — PROGRESS NOTES
Nurse's Note: Follow-Up Postpartum Visit   Visit Date: 11/04/2024  Subjective: The patient reports that she is doing well postpartum. She has attended her WIC appointment, which has alleviated her previous insecurities regarding formula feeding. The patient mentioned that her infant has had a diaper rash for the last few days. However, she expressed concern about her infant tilting her head in one direction.  Objective:  Mothers 2-Month Postpartum Assessment:  General Appearance: Patient appears well-nourished, alert, and engaged in conversation.  Emotional Status: Patient reports mood is stable with occasional feelings of overwhelmedness which she expresses is normal for new mothers. No signs of significant anxiety or depression.  Breasts: Soft, non-tender; no signs of mastitis or other complications.  Abdomen: Soft, non-tender. Patient reports no abdominal pain and normal bowel movements.  Perineum: Healing well post-delivery; no signs of infection or complications.  Lochia: Normal, minimal discharge noted; no foul odor or bright red bleeding.  Physical Activity: Patient has gradually increased her activity levels.  Infant Assessment:  General Appearance: Alert and active; engaging with the environment.  Vital Signs: Stable; within normal limits.  Developmental Assessment:  Motor Skills: Infant demonstrates good motor skills, turning her head left and right.  Observed tummy time for 10 minutes: Infant shows significant growth during this time, responding to sounds and kicking her legs energetically.  The infant was able to stand when prompted.  Skin Examination: No signs of redness or diaper rash noted despite the mother's concerns.  Assessment:  Mother is adapting well to postpartum life and managing feeding concerns with the support from [place].  Infant's health and development are within normal limits. No diaper rash detected, and the slight head tilt may be within the normal range of development at this  age.  Plan:  Reassurance:  Reinforce to the mother that the infant's slight head tilt is common and may not indicate any underlying issues. Encourage continued observation and engagement with the infant's motor skills.  Diaper Care Education:  Educate the mother on proper diaper care practices to prevent diaper rash:  Change the infant's diaper frequently, especially after bowel movements.  Clean the diaper area thoroughly with soft wipes or warm water.  Allow the area to air-dry before putting on a new diaper.  Use a barrier cream or ointment if the infant is prone to diaper rash; these products can help protect the skin.  Tummy Time Education:  Continue incorporating tummy time into the infant's daily routine:  Aim for at least 20 to 30 minutes of tummy time each day, gradually increasing duration as the infant grows stronger.  Use toys and mirrors to encourage the infant to lift her head and build neck and shoulder strength.  Supervise closely during tummy time and ensure a safe, flat surface is used.  Feeding Education:  Share tips for successful feeding:  Encourage the mother to follow the infant's hunger cues, offering formula when the infant shows signs of hunger (rooting, sucking on hands).  Discuss safe preparation and storage of formula, as well as the importance of washing hands before handling formula or the infant.  Reiterate that [facility] provides valuable resources and nutrition support to help manage feeding concerns.  Postpartum Recovery Monitoring:  Encourage the mother to reach out if she experiences any unusual symptoms such as increased mood swings, physical discomfort, or any other concerns.

## 2025-03-27 ENCOUNTER — OFFICE VISIT (OUTPATIENT)
Dept: PRIMARY CARE CLINIC | Facility: CLINIC | Age: 35
End: 2025-03-27
Payer: MEDICAID

## 2025-03-27 VITALS
OXYGEN SATURATION: 99 % | SYSTOLIC BLOOD PRESSURE: 90 MMHG | DIASTOLIC BLOOD PRESSURE: 62 MMHG | RESPIRATION RATE: 18 BRPM | WEIGHT: 125.75 LBS | HEART RATE: 64 BPM | HEIGHT: 62 IN | BODY MASS INDEX: 23.14 KG/M2

## 2025-03-27 DIAGNOSIS — Z86.32 HISTORY OF GESTATIONAL DIABETES: ICD-10-CM

## 2025-03-27 DIAGNOSIS — D64.9 ANEMIA, UNSPECIFIED TYPE: ICD-10-CM

## 2025-03-27 DIAGNOSIS — Z13.220 ENCOUNTER FOR LIPID SCREENING FOR CARDIOVASCULAR DISEASE: ICD-10-CM

## 2025-03-27 DIAGNOSIS — D72.829 LEUKOCYTOSIS, UNSPECIFIED TYPE: ICD-10-CM

## 2025-03-27 DIAGNOSIS — J02.9 SORETHROAT: Primary | ICD-10-CM

## 2025-03-27 DIAGNOSIS — Z13.6 ENCOUNTER FOR LIPID SCREENING FOR CARDIOVASCULAR DISEASE: ICD-10-CM

## 2025-03-27 PROCEDURE — 1159F MED LIST DOCD IN RCRD: CPT | Mod: CPTII,,, | Performed by: INTERNAL MEDICINE

## 2025-03-27 PROCEDURE — 99999 PR PBB SHADOW E&M-EST. PATIENT-LVL III: CPT | Mod: PBBFAC,,, | Performed by: INTERNAL MEDICINE

## 2025-03-27 PROCEDURE — 3074F SYST BP LT 130 MM HG: CPT | Mod: CPTII,,, | Performed by: INTERNAL MEDICINE

## 2025-03-27 PROCEDURE — 3078F DIAST BP <80 MM HG: CPT | Mod: CPTII,,, | Performed by: INTERNAL MEDICINE

## 2025-03-27 PROCEDURE — 1160F RVW MEDS BY RX/DR IN RCRD: CPT | Mod: CPTII,,, | Performed by: INTERNAL MEDICINE

## 2025-03-27 PROCEDURE — 99213 OFFICE O/P EST LOW 20 MIN: CPT | Mod: PBBFAC,PN | Performed by: INTERNAL MEDICINE

## 2025-03-27 PROCEDURE — 99214 OFFICE O/P EST MOD 30 MIN: CPT | Mod: S$PBB,,, | Performed by: INTERNAL MEDICINE

## 2025-03-27 PROCEDURE — 3008F BODY MASS INDEX DOCD: CPT | Mod: CPTII,,, | Performed by: INTERNAL MEDICINE

## 2025-03-27 NOTE — PROGRESS NOTES
Subjective:       Patient ID: Sean Trujillo is a 35 y.o. female.    Chief Complaint: Annual Exam and Sore Throat    HPI  History of Present Illness    CHIEF COMPLAINT:  Sean presents today for sore throat.    HISTORY OF PRESENT ILLNESS:  She reports sore throat that started yesterday morning with pain while eating. She denies fever and cough.    MEDICAL HISTORY:  She has a history of chronic cough occurring approximately once yearly, lasting 2 weeks to 1 month. Previous allergy medication taken at bedtime was effective for cough management, though she cannot recall the specific prescription. She also has a history of anemia requiring annual monitoring.    LABS:  Blood tests from August of last year showed low blood count and elevated white blood cell count.    ANXIETY:  She reports persistent and excessive worry about various issues, even those beyond her control. She has difficulty controlling these worries which persist even when engaged in other activities. She acknowledges her level of worry is disproportionate and problematic. Her anxiety frequently manifests as headaches.    FATIGUE:  She reports experiencing fatigue and lack of energy for approximately 20 weeks.    FAMILY PLANNING:  She reports actively trying to conceive and is uncertain about current pregnancy status. She expresses preference to avoid medications that may affect conception or fetal development.    ALLERGIES:  She denies any known drug or environmental allergies.      ROS:  General: -fever, -chills, +fatigue, -weight gain, -weight loss, +decreased energy levels  Eyes: -vision changes, -redness, -discharge  ENT: -ear pain, -nasal congestion, +sore throat  Cardiovascular: -chest pain, -palpitations, -lower extremity edema  Respiratory: +cough, -shortness of breath  Gastrointestinal: -abdominal pain, -nausea, -vomiting, -diarrhea, -constipation, -blood in stool  Genitourinary: -dysuria, -hematuria, -frequency  Musculoskeletal: -joint pain,  -muscle pain  Skin: -rash, -lesion  Neurological: +headache, -dizziness, -numbness, -tingling  Psychiatric: +anxiety, -depression, -sleep difficulty  Head: +head pain       Review of Systems    Objective:      Physical Exam  Physical Exam    General: No acute distress. Well-developed. Well-nourished.  Eyes: EOMI. Sclerae anicteric.  HENT: Normocephalic. Atraumatic. Nares patent. Moist oral mucosa.throat patchy erythema no exudate  Ears: Bilateral TMs clear. Bilateral EACs clear.  Cardiovascular: Regular rate. Regular rhythm. No murmurs. No rubs. No gallops. Normal S1, S2.  Respiratory: Normal respiratory effort. Clear to auscultation bilaterally. No rales. No rhonchi. No wheezing.  Abdomen: Soft. Non-tender. Non-distended. Normoactive bowel sounds.  Musculoskeletal: No  obvious deformity.  Extremities: No lower extremity edema.  Neurological: Alert & oriented x3. No slurred speech. Normal gait.  Psychiatric: Normal mood. Normal affect. Good insight. Good judgment.  Skin: Warm. Dry. No rash.           Assessment:       1. Sorethroat    2. Anemia, unspecified type    3. History of gestational diabetes    4. Leukocytosis, unspecified type    5. Encounter for lipid screening for cardiovascular disease        Plan:       Sorethroat  -     amoxicillin (AMOXIL) 500 MG Tab; Take 1 tablet (500 mg total) by mouth 3 (three) times daily. for 10 days  Dispense: 30 tablet; Refill: 0    Anemia, unspecified type  -     TSH; Future; Expected date: 03/30/2025  -     T4, Free; Future; Expected date: 03/30/2025    History of gestational diabetes  -     Hemoglobin A1C; Future; Expected date: 03/30/2025  -     Ferritin; Future; Expected date: 03/30/2025  -     Iron and TIBC; Future; Expected date: 03/30/2025  -     Vitamin B12; Future; Expected date: 03/30/2025  -     Folate; Future; Expected date: 03/30/2025    Leukocytosis, unspecified type  -     CBC Auto Differential; Future; Expected date: 03/30/2025  -     Comprehensive Metabolic  Panel; Future; Expected date: 03/30/2025  -     Urinalysis; Future; Expected date: 03/30/2025    Encounter for lipid screening for cardiovascular disease  -     Lipid Panel; Future; Expected date: 03/30/2025      Assessment & Plan    J02.9 Acute pharyngitis, unspecified  F41.1 Generalized anxiety disorder  R05.3 Chronic cough  D64.9 Anemia, unspecified  R79.9 Abnormal finding of blood chemistry, unspecified  R53.82 Chronic fatigue, unspecified  Z86.2 Personal history of diseases of the blood and blood-forming organs and certain disorders involving the immune mechanism    IMPRESSION:  - Assessed sore throat symptoms, determining it to be mild and likely self-limiting.  - Evaluated concerns about previous blood test results, noting slightly elevated WBC count likely due to frequent scratches or minor infections.  - Considered history of recurrent cough, potentially indicating mild asthma or allergies.  - Considered pregnancy planning status when discussing medication options.    ACUTE PHARYNGITIS:  - Recommend drinking fresh water to alleviate sore throat symptoms.  - Prescribed Efrin nasal spray for sore throat relief.  - Prescribed Amoxicillin as a safe antibiotic option, considering potential pregnancy.  - Sean reports throat pain for 1 day, since yesterday morning, denying fever or cough.  - Noted that the patient experiences pain when eating.  - Assessed the condition as mild and likely to resolve with hydration.  - Instructed the patient to report if symptoms worsen or persist beyond 5-7 days.    GENERALIZED ANXIETY DISORDER:  - Discussed the importance of managing stress and its impact on overall health.  - Provided information on meditation and spiritual retreats as potential stress management techniques.  - Sean reports excessive worrying about various issues and experiences physical symptoms of anxiety, including headaches.  - Noted the patient's acknowledgment of inability to control worrying.  - Discussed  the prevalence of stress and anxiety in the [place] and potential risks of medication dependency for anxiety.  - Recommend follow up in 4 weeks to assess the effectiveness of non-pharmacological interventions.    CHRONIC COUGH AND MILD ASTHMA:  - Explained the relationship between allergies and asthma, noting that asthma is an allergic reaction in the lungs.  - Sean reports an annual cough lasting 2 to 4 weeks.  - Assessed the condition as mild asthma.  - Noted that previous treatment with allergy medication was effective.  - Suggested antihistamines for treatment, but advised caution due to potential pregnancy.  - Recommend monitoring cough frequency and severity, and to report any changes in symptoms.    ANEMIA AND CHRONIC FATIGUE:  - Ordered labs to update previous results and check for anemia, liver function, and other routine parameters.  - Sean reports regular labs and a history of low blood count.  - Noted that the patient reports feeling lack of energy for 20 weeks.  - Suggested anemia as a possible cause of fatigue.  - Recommend checking food intake for folic acid.  - Advised the patient to increase intake of iron-rich foods and consider iron supplementation if lab results indicate deficiency.  - Considered anemia as a possible cause of fatigue.  - Recommend checking food intake and routine testing.  - Ordered labs to investigate the cause of fatigue.  - Advised the patient to maintain a sleep diary and practice good sleep hygiene.  - Suggested light exercise, such as daily walks, to potentially improve energy levels.    ABNORMAL BLOOD CHEMISTRY:  - Ordered labs to update previous results and kiah  ck for liver function and other routine parameters.  - Sean reports previous blood test results showing low blood count and high white blood cell count.  - Noted that white blood cell count is slightly elevated, possibly due to scratches or infection.  - Recommend repeating labs to refresh old results.  - Advised the  patient to stay well-hydrated before the blood draw and to fast for 8-12 hours if lipid panel is included.    PERSONAL HISTORY OF BLOOD DISORDERS:  - Acknowledged patient's history of anemia and regular labs.  - Recommend regular testing to monitor patient's condition.  - Ordered new labs to assess current status.  - Advised the patient to report any new or worsening symptoms related to previous blood disorders.  - Recommend annual follow-ups to monitor for any recurrence or new developments in blood-related conditions.    GENERAL RECOMMENDATIONS AND FOLLOW-UP:  - Explained the risks of dependency associated with certain medications, particularly controlled substances.  - Started Phenylephrine decongestant to be taken before air travel to prevent ear pressure issues.  - Follow up next week for labs.  - Contact the office if any allergic reactions occur.           Medication List with Changes/Refills   New Medications    AMOXICILLIN (AMOXIL) 500 MG TAB    Take 1 tablet (500 mg total) by mouth 3 (three) times daily. for 10 days   Discontinued Medications    DOCUSATE SODIUM (COLACE) 100 MG CAPSULE    Take 1 capsule (100 mg total) by mouth 2 (two) times daily.    IBUPROFEN (ADVIL,MOTRIN) 600 MG TABLET    Take 1 tablet (600 mg total) by mouth every 6 (six) hours as needed for Pain.        This note was generated with the assistance of ambient listening technology. Verbal consent was obtained by the patient and accompanying visitor(s) for the recording of patient appointment to facilitate this note. I attest to having reviewed and edited the generated note for accuracy, though some syntax or spelling errors may persist. Please contact the author of this note for any clarification.

## 2025-03-30 RX ORDER — AMOXICILLIN 500 MG/1
500 TABLET, FILM COATED ORAL 3 TIMES DAILY
Qty: 30 TABLET | Refills: 0 | Status: SHIPPED | OUTPATIENT
Start: 2025-03-30 | End: 2025-04-09

## 2025-04-06 ENCOUNTER — RESULTS FOLLOW-UP (OUTPATIENT)
Dept: PRIMARY CARE CLINIC | Facility: CLINIC | Age: 35
End: 2025-04-06
Payer: MEDICAID

## 2025-04-07 ENCOUNTER — TELEPHONE (OUTPATIENT)
Dept: PRIMARY CARE CLINIC | Facility: CLINIC | Age: 35
End: 2025-04-07
Payer: MEDICAID

## 2025-04-07 NOTE — TELEPHONE ENCOUNTER
----- Message from Tech Amaiya sent at 4/7/2025  4:30 PM CDT -----  Contact: [phone]  Type: Returning a callWho left a message? Dr. Santiago did the practice call? todayDoes patient know what this is regarding: yWould the patient rather a call back or a response via [facility]? callComments:

## 2025-04-07 NOTE — PROGRESS NOTES
Called pt via  [facility] #[phone]. Pt didn't answer, left  to call clinic at [phone] in regards to lab results

## 2025-05-01 ENCOUNTER — TELEPHONE (OUTPATIENT)
Dept: OBSTETRICS AND GYNECOLOGY | Facility: CLINIC | Age: 35
End: 2025-05-01
Payer: MEDICAID

## 2025-05-27 ENCOUNTER — CLINICAL SUPPORT (OUTPATIENT)
Dept: OBSTETRICS AND GYNECOLOGY | Facility: CLINIC | Age: 35
End: 2025-05-27
Payer: MEDICAID

## 2025-05-27 DIAGNOSIS — N91.2 AMENORRHEA: Primary | ICD-10-CM

## 2025-05-27 PROCEDURE — 99212 OFFICE O/P EST SF 10 MIN: CPT | Mod: PBBFAC

## 2025-05-27 PROCEDURE — 99999 PR PBB SHADOW E&M-EST. PATIENT-LVL II: CPT | Mod: PBBFAC,,,

## 2025-05-27 NOTE — PROGRESS NOTES
Spoke with patient for a total of 30 minutes.  Updated chart to reflect up to date patient demographics.  Medication, pharmacy, and family history updated.  Patient was guided through expectations of OB/GYN care throughout history of pregnancy.  Pregnancy confirmation, dating u/s initial OB  scheduled for the pregnancy.    Lmp:4/6/25  Dimitris

## 2025-06-04 ENCOUNTER — OFFICE VISIT (OUTPATIENT)
Dept: OBSTETRICS AND GYNECOLOGY | Facility: CLINIC | Age: 35
End: 2025-06-04
Payer: MEDICAID

## 2025-06-04 ENCOUNTER — HOSPITAL ENCOUNTER (OUTPATIENT)
Dept: PERINATAL CARE | Facility: OTHER | Age: 35
Discharge: HOME | End: 2025-06-04
Attending: STUDENT IN AN ORGANIZED HEALTH CARE EDUCATION/TRAINING PROGRAM
Payer: MEDICAID

## 2025-06-04 VITALS
BODY MASS INDEX: 22.68 KG/M2 | SYSTOLIC BLOOD PRESSURE: 90 MMHG | HEIGHT: 62 IN | WEIGHT: 123.25 LBS | DIASTOLIC BLOOD PRESSURE: 51 MMHG

## 2025-06-04 DIAGNOSIS — Z11.3 SCREEN FOR STD (SEXUALLY TRANSMITTED DISEASE): ICD-10-CM

## 2025-06-04 DIAGNOSIS — N91.2 AMENORRHEA: Primary | ICD-10-CM

## 2025-06-04 DIAGNOSIS — Z32.01 POSITIVE PREGNANCY TEST: ICD-10-CM

## 2025-06-04 DIAGNOSIS — O21.9 NAUSEA AND VOMITING IN PREGNANCY: ICD-10-CM

## 2025-06-04 DIAGNOSIS — N91.2 AMENORRHEA: ICD-10-CM

## 2025-06-04 PROCEDURE — 76817 TRANSVAGINAL US OBSTETRIC: CPT | Mod: 26,,, | Performed by: OBSTETRICS & GYNECOLOGY

## 2025-06-04 PROCEDURE — 3044F HG A1C LEVEL LT 7.0%: CPT | Mod: CPTII,,,

## 2025-06-04 PROCEDURE — 76801 OB US < 14 WKS SINGLE FETUS: CPT

## 2025-06-04 PROCEDURE — 76801 OB US < 14 WKS SINGLE FETUS: CPT | Mod: 26,,, | Performed by: OBSTETRICS & GYNECOLOGY

## 2025-06-04 PROCEDURE — 87086 URINE CULTURE/COLONY COUNT: CPT

## 2025-06-04 PROCEDURE — 1160F RVW MEDS BY RX/DR IN RCRD: CPT | Mod: CPTII,,,

## 2025-06-04 PROCEDURE — 87591 N.GONORRHOEAE DNA AMP PROB: CPT

## 2025-06-04 PROCEDURE — 3008F BODY MASS INDEX DOCD: CPT | Mod: CPTII,,,

## 2025-06-04 PROCEDURE — 99204 OFFICE O/P NEW MOD 45 MIN: CPT | Mod: S$PBB,TH,,

## 2025-06-04 PROCEDURE — 3074F SYST BP LT 130 MM HG: CPT | Mod: CPTII,,,

## 2025-06-04 PROCEDURE — 1159F MED LIST DOCD IN RCRD: CPT | Mod: CPTII,,,

## 2025-06-04 PROCEDURE — 99999 PR PBB SHADOW E&M-EST. PATIENT-LVL III: CPT | Mod: PBBFAC,,,

## 2025-06-04 PROCEDURE — 3078F DIAST BP <80 MM HG: CPT | Mod: CPTII,,,

## 2025-06-04 PROCEDURE — 99213 OFFICE O/P EST LOW 20 MIN: CPT | Mod: PBBFAC,25,TH

## 2025-06-04 RX ORDER — ONDANSETRON 4 MG/1
4 TABLET, ORALLY DISINTEGRATING ORAL EVERY 6 HOURS PRN
Qty: 30 TABLET | Refills: 1 | Status: SHIPPED | OUTPATIENT
Start: 2025-06-04 | End: 2025-07-04

## 2025-06-05 ENCOUNTER — RESULTS FOLLOW-UP (OUTPATIENT)
Dept: OBSTETRICS AND GYNECOLOGY | Facility: CLINIC | Age: 35
End: 2025-06-05

## 2025-06-05 LAB
C TRACH DNA SPEC QL NAA+PROBE: NOT DETECTED
CTGC SOURCE (OHS) ORD-325: NORMAL
N GONORRHOEA DNA UR QL NAA+PROBE: NOT DETECTED

## 2025-06-06 LAB — BACTERIA UR CULT: NO GROWTH

## 2025-07-02 ENCOUNTER — INITIAL PRENATAL (OUTPATIENT)
Dept: OBSTETRICS AND GYNECOLOGY | Facility: CLINIC | Age: 35
End: 2025-07-02
Payer: MEDICAID

## 2025-07-02 ENCOUNTER — PATIENT MESSAGE (OUTPATIENT)
Dept: MATERNAL FETAL MEDICINE | Facility: CLINIC | Age: 35
End: 2025-07-02
Payer: MEDICAID

## 2025-07-02 VITALS
DIASTOLIC BLOOD PRESSURE: 64 MMHG | BODY MASS INDEX: 22.28 KG/M2 | WEIGHT: 121.81 LBS | SYSTOLIC BLOOD PRESSURE: 102 MMHG

## 2025-07-02 DIAGNOSIS — Z86.32 HISTORY OF GESTATIONAL DIABETES MELLITUS (GDM): ICD-10-CM

## 2025-07-02 DIAGNOSIS — O09.299 HISTORY OF SHOULDER DYSTOCIA IN PRIOR PREGNANCY, CURRENTLY PREGNANT: ICD-10-CM

## 2025-07-02 DIAGNOSIS — Z28.39 SUSCEPTIBLE TO VARICELLA (NON-IMMUNE), CURRENTLY PREGNANT: ICD-10-CM

## 2025-07-02 DIAGNOSIS — O09.899 SUSCEPTIBLE TO VARICELLA (NON-IMMUNE), CURRENTLY PREGNANT: ICD-10-CM

## 2025-07-02 DIAGNOSIS — Z34.81 ENCOUNTER FOR SUPERVISION OF OTHER NORMAL PREGNANCY IN FIRST TRIMESTER: Primary | ICD-10-CM

## 2025-07-02 PROCEDURE — 99999 PR PBB SHADOW E&M-EST. PATIENT-LVL II: CPT | Mod: PBBFAC,,, | Performed by: STUDENT IN AN ORGANIZED HEALTH CARE EDUCATION/TRAINING PROGRAM

## 2025-07-02 PROCEDURE — 99212 OFFICE O/P EST SF 10 MIN: CPT | Mod: PBBFAC,TH | Performed by: STUDENT IN AN ORGANIZED HEALTH CARE EDUCATION/TRAINING PROGRAM

## 2025-07-02 NOTE — PROGRESS NOTES
Pregnancy dating, labs, ultrasound reports, prenatal testing, and problem list; prior records and results; and available outside records were reviewed and updated in EMR.  Pertinent findings were noted below.    Reason for Visit   Initial Prenatal Visit    HPI   35 y.o., at 12w4d by Estimated Date of Delivery: 1/10/26    Here for initial OB visit. Overall doing well, but very tired. Having nausea, no vomiting. Almost wishes she could vomit so she would feel better. Not taking anything, prefers not to take medications. No cramping or bleeding. Occasional headaches that resolve on their own.She is taking a PNV, no other meds.    This is her 4th pregnancy. She has 3 girls at home. History of A1GDM in second pregnancy. Third pregnancy complicated by monosomy X result on NIPT. No US abnormalities other than enlarged NT at 13 weeks. Remainder of pregnancy was uncomplicated. Delivery was complicated by should dystocia, no deficits. Ludy is 11 months and is already trying to walk!      Exam   /64   Wt 55.2 kg (121 lb 12.8 oz)   LMP 04/06/2025 (Exact Date) Comment: last a few days  BMI 22.28 kg/m²     TWG: -1 pound    GENERAL: No acute distress  ABD: Gravid  BSUS: Active fetus, +FHR    Assessment and Plan   Encounter for supervision of other normal pregnancy in first trimester  -     Connected MOM Enrollment  -     Assign Connected MOM Program Consent Questionnaire      -- 1T labs reviewed. VNI, otherwise unremarkable.  -- She declines genetic screening with NIPT but does want NT scan.  -- Enrolled in connected mom.  -- Start daily aspirin for preE ppx.  -- Briefly discussed delivery planning in setting of shoulder dystocia last time. She also feels her tear didn't heal well. Will continue to discuss possible elective C/S.  -- RTC in 4 weeks.      Lucía Shanks MD

## 2025-07-03 ENCOUNTER — PROCEDURE VISIT (OUTPATIENT)
Dept: MATERNAL FETAL MEDICINE | Facility: CLINIC | Age: 35
End: 2025-07-03
Payer: MEDICAID

## 2025-07-03 ENCOUNTER — PATIENT MESSAGE (OUTPATIENT)
Dept: HEMATOLOGY/ONCOLOGY | Facility: CLINIC | Age: 35
End: 2025-07-03
Payer: MEDICAID

## 2025-07-03 DIAGNOSIS — Z36.89 ENCOUNTER FOR FETAL ANATOMIC SURVEY: Primary | ICD-10-CM

## 2025-07-03 DIAGNOSIS — Z34.81 ENCOUNTER FOR SUPERVISION OF OTHER NORMAL PREGNANCY IN FIRST TRIMESTER: ICD-10-CM

## 2025-07-03 PROCEDURE — 76813 OB US NUCHAL MEAS 1 GEST: CPT | Mod: PBBFAC | Performed by: STUDENT IN AN ORGANIZED HEALTH CARE EDUCATION/TRAINING PROGRAM

## 2025-07-07 ENCOUNTER — RESULTS FOLLOW-UP (OUTPATIENT)
Dept: OBSTETRICS AND GYNECOLOGY | Facility: HOSPITAL | Age: 35
End: 2025-07-07

## 2025-07-16 ENCOUNTER — PATIENT MESSAGE (OUTPATIENT)
Dept: OBSTETRICS AND GYNECOLOGY | Facility: CLINIC | Age: 35
End: 2025-07-16
Payer: MEDICAID

## 2025-07-26 ENCOUNTER — PATIENT MESSAGE (OUTPATIENT)
Dept: OTHER | Facility: OTHER | Age: 35
End: 2025-07-26
Payer: MEDICAID

## 2025-07-30 ENCOUNTER — ROUTINE PRENATAL (OUTPATIENT)
Dept: OBSTETRICS AND GYNECOLOGY | Facility: CLINIC | Age: 35
End: 2025-07-30
Payer: MEDICAID

## 2025-07-30 VITALS — BODY MASS INDEX: 22.26 KG/M2 | DIASTOLIC BLOOD PRESSURE: 50 MMHG | SYSTOLIC BLOOD PRESSURE: 90 MMHG | WEIGHT: 121.69 LBS

## 2025-07-30 DIAGNOSIS — Z86.32 HISTORY OF GESTATIONAL DIABETES MELLITUS (GDM): ICD-10-CM

## 2025-07-30 DIAGNOSIS — O09.299 HISTORY OF SHOULDER DYSTOCIA IN PRIOR PREGNANCY, CURRENTLY PREGNANT: ICD-10-CM

## 2025-07-30 DIAGNOSIS — Z34.82 ENCOUNTER FOR SUPERVISION OF OTHER NORMAL PREGNANCY IN SECOND TRIMESTER: Primary | ICD-10-CM

## 2025-07-30 PROCEDURE — 99212 OFFICE O/P EST SF 10 MIN: CPT | Mod: PBBFAC,TH | Performed by: STUDENT IN AN ORGANIZED HEALTH CARE EDUCATION/TRAINING PROGRAM

## 2025-07-30 PROCEDURE — 99999 PR PBB SHADOW E&M-EST. PATIENT-LVL II: CPT | Mod: PBBFAC,,, | Performed by: STUDENT IN AN ORGANIZED HEALTH CARE EDUCATION/TRAINING PROGRAM

## 2025-07-30 NOTE — PROGRESS NOTES
Pregnancy dating, labs, ultrasound reports, prenatal testing, and problem list; prior records and results; and available outside records were reviewed and updated in EMR.  Pertinent findings were noted below.    Reason for Visit   Routine Prenatal Visit    HPI   35 y.o., at 16w4d by Estimated Date of Delivery: 1/10/26    Here for JERED. Feeling better than last visit.  No contractions, vaginal bleeding, leakage of fluid. Reports normal fetal movement. Did have episode of lightheadedness and near syncope when standing up after gardening in the afternoon heat. She also states she does not want a .      Exam   BP (!) 90/50   Wt 55.2 kg (121 lb 11.1 oz)   LMP 2025 (Exact Date) Comment: last a few days  BMI 22.26 kg/m²     TWG: -1 pound    GENERAL: No acute distress  ABD: Gravid  BSUS: Active fetus, +FHR    Assessment and Plan   Encounter for supervision of other normal pregnancy in second trimester    History of gestational diabetes mellitus (GDM)    History of shoulder dystocia in prior pregnancy, currently pregnant      -- Anatomy scan scheduled.  -- RTC in 4 weeks.      Lucía Shanks MD

## 2025-08-02 ENCOUNTER — PATIENT MESSAGE (OUTPATIENT)
Dept: OTHER | Facility: OTHER | Age: 35
End: 2025-08-02
Payer: MEDICAID

## 2025-08-23 ENCOUNTER — PATIENT MESSAGE (OUTPATIENT)
Dept: OTHER | Facility: OTHER | Age: 35
End: 2025-08-23
Payer: MEDICAID

## 2025-08-25 ENCOUNTER — PROCEDURE VISIT (OUTPATIENT)
Dept: MATERNAL FETAL MEDICINE | Facility: CLINIC | Age: 35
End: 2025-08-25
Payer: MEDICAID

## 2025-08-25 DIAGNOSIS — Z36.89 ENCOUNTER FOR FETAL ANATOMIC SURVEY: ICD-10-CM

## 2025-08-25 PROCEDURE — 76811 OB US DETAILED SNGL FETUS: CPT | Mod: PBBFAC | Performed by: OBSTETRICS & GYNECOLOGY

## 2025-08-26 DIAGNOSIS — Z36.89 ENCOUNTER FOR ULTRASOUND TO ASSESS FETAL GROWTH: Primary | ICD-10-CM

## 2025-08-27 ENCOUNTER — ROUTINE PRENATAL (OUTPATIENT)
Dept: OBSTETRICS AND GYNECOLOGY | Facility: CLINIC | Age: 35
End: 2025-08-27
Payer: MEDICAID

## 2025-08-27 VITALS — WEIGHT: 125.88 LBS | BODY MASS INDEX: 23.02 KG/M2 | DIASTOLIC BLOOD PRESSURE: 58 MMHG | SYSTOLIC BLOOD PRESSURE: 90 MMHG

## 2025-08-27 DIAGNOSIS — Z3A.20 20 WEEKS GESTATION OF PREGNANCY: Primary | ICD-10-CM

## 2025-08-27 DIAGNOSIS — Z34.82 ENCOUNTER FOR SUPERVISION OF OTHER NORMAL PREGNANCY IN SECOND TRIMESTER: ICD-10-CM

## 2025-08-27 PROCEDURE — 99999 PR PBB SHADOW E&M-EST. PATIENT-LVL II: CPT | Mod: PBBFAC,,,

## 2025-08-27 PROCEDURE — 99212 OFFICE O/P EST SF 10 MIN: CPT | Mod: PBBFAC,TH
